# Patient Record
Sex: MALE | Race: WHITE | NOT HISPANIC OR LATINO | Employment: UNEMPLOYED | URBAN - METROPOLITAN AREA
[De-identification: names, ages, dates, MRNs, and addresses within clinical notes are randomized per-mention and may not be internally consistent; named-entity substitution may affect disease eponyms.]

---

## 2018-07-02 ENCOUNTER — HOSPITAL ENCOUNTER (INPATIENT)
Facility: HOSPITAL | Age: 53
LOS: 6 days | Discharge: HOME | End: 2018-07-08
Attending: EMERGENCY MEDICINE | Admitting: INTERNAL MEDICINE

## 2018-07-02 ENCOUNTER — APPOINTMENT (OUTPATIENT)
Dept: CT IMAGING | Facility: HOSPITAL | Age: 53
End: 2018-07-02

## 2018-07-02 DIAGNOSIS — R10.84 DIFFUSE ABDOMINAL PAIN: ICD-10-CM

## 2018-07-02 DIAGNOSIS — K70.11 ALCOHOLIC HEPATITIS WITH ASCITES: ICD-10-CM

## 2018-07-02 DIAGNOSIS — K70.31 ASCITES DUE TO ALCOHOLIC CIRRHOSIS (HCC): Primary | ICD-10-CM

## 2018-07-02 DIAGNOSIS — R79.89 ABNORMAL LFTS: ICD-10-CM

## 2018-07-02 DIAGNOSIS — R00.0 TACHYCARDIA: ICD-10-CM

## 2018-07-02 DIAGNOSIS — K70.11 ASCITES DUE TO ALCOHOLIC HEPATITIS: ICD-10-CM

## 2018-07-02 DIAGNOSIS — Z23 NEED FOR HEPATITIS A AND B VACCINATION: ICD-10-CM

## 2018-07-02 PROBLEM — K74.60 CIRRHOSIS (HCC): Status: ACTIVE | Noted: 2018-07-02

## 2018-07-02 PROBLEM — Z72.0 TOBACCO USE: Status: ACTIVE | Noted: 2018-07-02

## 2018-07-02 PROBLEM — Z78.9 ALCOHOL USE: Status: ACTIVE | Noted: 2018-07-02

## 2018-07-02 PROBLEM — I10 HYPERTENSION: Status: ACTIVE | Noted: 2018-07-02

## 2018-07-02 LAB
ALBUMIN SERPL-MCNC: 3.01 G/DL (ref 3.2–4.8)
ALBUMIN/GLOB SERPL: 0.6 G/DL (ref 1.5–2.5)
ALP SERPL-CCNC: 324 U/L (ref 25–100)
ALT SERPL W P-5'-P-CCNC: 30 U/L (ref 7–40)
AMMONIA BLD-SCNC: 31 UMOL/L (ref 19–60)
ANION GAP SERPL CALCULATED.3IONS-SCNC: 10 MMOL/L (ref 3–11)
APTT PPP: 26.4 SECONDS (ref 24–31)
AST SERPL-CCNC: 249 U/L (ref 0–33)
BACTERIA UR QL AUTO: NORMAL /HPF
BASOPHILS # BLD AUTO: 0.03 10*3/MM3 (ref 0–0.2)
BASOPHILS NFR BLD AUTO: 0.2 % (ref 0–1)
BILIRUB SERPL-MCNC: 4 MG/DL (ref 0.3–1.2)
BILIRUB UR QL STRIP: ABNORMAL
BUN BLD-MCNC: 7 MG/DL (ref 9–23)
BUN/CREAT SERPL: 10.3 (ref 7–25)
CALCIUM SPEC-SCNC: 8.7 MG/DL (ref 8.7–10.4)
CHLORIDE SERPL-SCNC: 89 MMOL/L (ref 99–109)
CLARITY UR: CLEAR
CO2 SERPL-SCNC: 38 MMOL/L (ref 20–31)
COLOR UR: ABNORMAL
CREAT BLD-MCNC: 0.68 MG/DL (ref 0.6–1.3)
DEPRECATED RDW RBC AUTO: 60.1 FL (ref 37–54)
EOSINOPHIL # BLD AUTO: 0.01 10*3/MM3 (ref 0–0.3)
EOSINOPHIL NFR BLD AUTO: 0.1 % (ref 0–3)
ERYTHROCYTE [DISTWIDTH] IN BLOOD BY AUTOMATED COUNT: 15.7 % (ref 11.3–14.5)
ETHANOL BLD-MCNC: 37 MG/DL (ref 0–10)
GFR SERPL CREATININE-BSD FRML MDRD: 123 ML/MIN/1.73
GLOBULIN UR ELPH-MCNC: 4.8 GM/DL
GLUCOSE BLD-MCNC: 120 MG/DL (ref 70–100)
GLUCOSE UR STRIP-MCNC: NEGATIVE MG/DL
HCT VFR BLD AUTO: 40.8 % (ref 38.9–50.9)
HGB BLD-MCNC: 14.2 G/DL (ref 13.1–17.5)
HGB UR QL STRIP.AUTO: NEGATIVE
HOLD SPECIMEN: NORMAL
HOLD SPECIMEN: NORMAL
HYALINE CASTS UR QL AUTO: NORMAL /LPF
IMM GRANULOCYTES # BLD: 0.05 10*3/MM3 (ref 0–0.03)
IMM GRANULOCYTES NFR BLD: 0.3 % (ref 0–0.6)
INR PPP: 1.31 (ref 0.91–1.09)
KETONES UR QL STRIP: NEGATIVE
LEUKOCYTE ESTERASE UR QL STRIP.AUTO: ABNORMAL
LIPASE SERPL-CCNC: 26 U/L (ref 6–51)
LYMPHOCYTES # BLD AUTO: 0.89 10*3/MM3 (ref 0.6–4.8)
LYMPHOCYTES NFR BLD AUTO: 6.2 % (ref 24–44)
MCH RBC QN AUTO: 36.7 PG (ref 27–31)
MCHC RBC AUTO-ENTMCNC: 34.8 G/DL (ref 32–36)
MCV RBC AUTO: 105.4 FL (ref 80–99)
MONOCYTES # BLD AUTO: 1.01 10*3/MM3 (ref 0–1)
MONOCYTES NFR BLD AUTO: 7 % (ref 0–12)
NEUTROPHILS # BLD AUTO: 12.45 10*3/MM3 (ref 1.5–8.3)
NEUTROPHILS NFR BLD AUTO: 86.5 % (ref 41–71)
NITRITE UR QL STRIP: POSITIVE
PH UR STRIP.AUTO: 6.5 [PH] (ref 5–8)
PLATELET # BLD AUTO: 160 10*3/MM3 (ref 150–450)
PMV BLD AUTO: 12.2 FL (ref 6–12)
POTASSIUM BLD-SCNC: 3 MMOL/L (ref 3.5–5.5)
PROT SERPL-MCNC: 7.8 G/DL (ref 5.7–8.2)
PROT UR QL STRIP: ABNORMAL
PROTHROMBIN TIME: 13.8 SECONDS (ref 9.6–11.5)
RBC # BLD AUTO: 3.87 10*6/MM3 (ref 4.2–5.76)
RBC # UR: NORMAL /HPF
REF LAB TEST METHOD: NORMAL
SODIUM BLD-SCNC: 137 MMOL/L (ref 132–146)
SP GR UR STRIP: 1.03 (ref 1–1.03)
SQUAMOUS #/AREA URNS HPF: NORMAL /HPF
UROBILINOGEN UR QL STRIP: ABNORMAL
WBC NRBC COR # BLD: 14.39 10*3/MM3 (ref 3.5–10.8)
WBC UR QL AUTO: NORMAL /HPF
WHOLE BLOOD HOLD SPECIMEN: NORMAL
WHOLE BLOOD HOLD SPECIMEN: NORMAL

## 2018-07-02 PROCEDURE — 80306 DRUG TEST PRSMV INSTRMNT: CPT | Performed by: NURSE PRACTITIONER

## 2018-07-02 PROCEDURE — 85730 THROMBOPLASTIN TIME PARTIAL: CPT | Performed by: EMERGENCY MEDICINE

## 2018-07-02 PROCEDURE — 99223 1ST HOSP IP/OBS HIGH 75: CPT | Performed by: INTERNAL MEDICINE

## 2018-07-02 PROCEDURE — 83690 ASSAY OF LIPASE: CPT

## 2018-07-02 PROCEDURE — 80307 DRUG TEST PRSMV CHEM ANLYZR: CPT | Performed by: EMERGENCY MEDICINE

## 2018-07-02 PROCEDURE — 25010000002 LORAZEPAM PER 2 MG: Performed by: INTERNAL MEDICINE

## 2018-07-02 PROCEDURE — 80053 COMPREHEN METABOLIC PANEL: CPT

## 2018-07-02 PROCEDURE — 25010000002 THIAMINE PER 100 MG: Performed by: NURSE PRACTITIONER

## 2018-07-02 PROCEDURE — 74178 CT ABD&PLV WO CNTR FLWD CNTR: CPT

## 2018-07-02 PROCEDURE — 85610 PROTHROMBIN TIME: CPT | Performed by: EMERGENCY MEDICINE

## 2018-07-02 PROCEDURE — 0 IOPAMIDOL PER 1 ML: Performed by: EMERGENCY MEDICINE

## 2018-07-02 PROCEDURE — 99406 BEHAV CHNG SMOKING 3-10 MIN: CPT | Performed by: NURSE PRACTITIONER

## 2018-07-02 PROCEDURE — 82140 ASSAY OF AMMONIA: CPT | Performed by: EMERGENCY MEDICINE

## 2018-07-02 PROCEDURE — 81001 URINALYSIS AUTO W/SCOPE: CPT | Performed by: EMERGENCY MEDICINE

## 2018-07-02 PROCEDURE — 99284 EMERGENCY DEPT VISIT MOD MDM: CPT

## 2018-07-02 PROCEDURE — 85025 COMPLETE CBC W/AUTO DIFF WBC: CPT

## 2018-07-02 RX ORDER — SODIUM CHLORIDE 0.9 % (FLUSH) 0.9 %
10 SYRINGE (ML) INJECTION AS NEEDED
Status: DISCONTINUED | OUTPATIENT
Start: 2018-07-02 | End: 2018-07-08 | Stop reason: HOSPADM

## 2018-07-02 RX ORDER — THIAMINE MONONITRATE (VIT B1) 100 MG
100 TABLET ORAL DAILY
Status: DISCONTINUED | OUTPATIENT
Start: 2018-07-03 | End: 2018-07-03

## 2018-07-02 RX ORDER — BUPRENORPHINE HYDROCHLORIDE AND NALOXONE HYDROCHLORIDE DIHYDRATE 8; 2 MG/1; MG/1
1 TABLET SUBLINGUAL DAILY
Status: DISCONTINUED | OUTPATIENT
Start: 2018-07-03 | End: 2018-07-05

## 2018-07-02 RX ORDER — SODIUM CHLORIDE 0.9 % (FLUSH) 0.9 %
1-10 SYRINGE (ML) INJECTION AS NEEDED
Status: DISCONTINUED | OUTPATIENT
Start: 2018-07-02 | End: 2018-07-08 | Stop reason: HOSPADM

## 2018-07-02 RX ORDER — AMLODIPINE BESYLATE 10 MG/1
10 TABLET ORAL DAILY
COMMUNITY
End: 2018-07-08 | Stop reason: HOSPADM

## 2018-07-02 RX ORDER — NICOTINE 21 MG/24HR
1 PATCH, TRANSDERMAL 24 HOURS TRANSDERMAL ONCE
Status: COMPLETED | OUTPATIENT
Start: 2018-07-02 | End: 2018-07-03

## 2018-07-02 RX ORDER — BUPRENORPHINE HYDROCHLORIDE AND NALOXONE HYDROCHLORIDE DIHYDRATE 8; 2 MG/1; MG/1
2 TABLET SUBLINGUAL DAILY
COMMUNITY

## 2018-07-02 RX ORDER — AMLODIPINE BESYLATE 10 MG/1
10 TABLET ORAL DAILY
Status: DISCONTINUED | OUTPATIENT
Start: 2018-07-03 | End: 2018-07-03

## 2018-07-02 RX ORDER — ONDANSETRON 4 MG/1
4 TABLET, FILM COATED ORAL EVERY 6 HOURS PRN
Status: DISCONTINUED | OUTPATIENT
Start: 2018-07-02 | End: 2018-07-08 | Stop reason: HOSPADM

## 2018-07-02 RX ORDER — NICOTINE 21 MG/24HR
1 PATCH, TRANSDERMAL 24 HOURS TRANSDERMAL EVERY 24 HOURS
Status: DISCONTINUED | OUTPATIENT
Start: 2018-07-03 | End: 2018-07-08 | Stop reason: HOSPADM

## 2018-07-02 RX ORDER — THIAMINE MONONITRATE (VIT B1) 100 MG
100 TABLET ORAL ONCE
Status: COMPLETED | OUTPATIENT
Start: 2018-07-02 | End: 2018-07-02

## 2018-07-02 RX ORDER — POTASSIUM CHLORIDE 7.45 MG/ML
10 INJECTION INTRAVENOUS
Status: DISCONTINUED | OUTPATIENT
Start: 2018-07-02 | End: 2018-07-08 | Stop reason: HOSPADM

## 2018-07-02 RX ORDER — DIAZEPAM 5 MG/1
5 TABLET ORAL EVERY 8 HOURS
Status: DISCONTINUED | OUTPATIENT
Start: 2018-07-02 | End: 2018-07-04

## 2018-07-02 RX ORDER — HYDROCHLOROTHIAZIDE 12.5 MG/1
12.5 TABLET ORAL DAILY
COMMUNITY
End: 2018-07-08 | Stop reason: HOSPADM

## 2018-07-02 RX ORDER — LORAZEPAM 1 MG/1
1 TABLET ORAL
Status: DISCONTINUED | OUTPATIENT
Start: 2018-07-02 | End: 2018-07-08 | Stop reason: HOSPADM

## 2018-07-02 RX ORDER — FUROSEMIDE 20 MG/1
20 TABLET ORAL 2 TIMES DAILY
COMMUNITY
End: 2018-07-08 | Stop reason: HOSPADM

## 2018-07-02 RX ORDER — LORAZEPAM 2 MG/ML
2 INJECTION INTRAMUSCULAR
Status: DISCONTINUED | OUTPATIENT
Start: 2018-07-02 | End: 2018-07-08 | Stop reason: HOSPADM

## 2018-07-02 RX ORDER — POTASSIUM CHLORIDE 750 MG/1
40 CAPSULE, EXTENDED RELEASE ORAL AS NEEDED
Status: DISCONTINUED | OUTPATIENT
Start: 2018-07-02 | End: 2018-07-08 | Stop reason: HOSPADM

## 2018-07-02 RX ORDER — OXYCODONE AND ACETAMINOPHEN 10; 325 MG/1; MG/1
1 TABLET ORAL EVERY 6 HOURS PRN
Status: ON HOLD | COMMUNITY
End: 2018-07-05

## 2018-07-02 RX ORDER — LORAZEPAM 2 MG/ML
1 INJECTION INTRAMUSCULAR
Status: DISCONTINUED | OUTPATIENT
Start: 2018-07-02 | End: 2018-07-08 | Stop reason: HOSPADM

## 2018-07-02 RX ORDER — ONDANSETRON 2 MG/ML
4 INJECTION INTRAMUSCULAR; INTRAVENOUS EVERY 6 HOURS PRN
Status: DISCONTINUED | OUTPATIENT
Start: 2018-07-02 | End: 2018-07-08 | Stop reason: HOSPADM

## 2018-07-02 RX ORDER — POTASSIUM CHLORIDE 1.5 G/1.77G
40 POWDER, FOR SOLUTION ORAL AS NEEDED
Status: DISCONTINUED | OUTPATIENT
Start: 2018-07-02 | End: 2018-07-08 | Stop reason: HOSPADM

## 2018-07-02 RX ORDER — DIPHENOXYLATE HYDROCHLORIDE AND ATROPINE SULFATE 2.5; .025 MG/1; MG/1
1 TABLET ORAL DAILY
Status: COMPLETED | OUTPATIENT
Start: 2018-07-03 | End: 2018-07-05

## 2018-07-02 RX ORDER — FOLIC ACID 1 MG/1
1 TABLET ORAL DAILY
Status: COMPLETED | OUTPATIENT
Start: 2018-07-03 | End: 2018-07-05

## 2018-07-02 RX ORDER — FUROSEMIDE 20 MG/1
20 TABLET ORAL
Status: DISCONTINUED | OUTPATIENT
Start: 2018-07-02 | End: 2018-07-03

## 2018-07-02 RX ORDER — LORAZEPAM 1 MG/1
2 TABLET ORAL
Status: DISCONTINUED | OUTPATIENT
Start: 2018-07-02 | End: 2018-07-08 | Stop reason: HOSPADM

## 2018-07-02 RX ORDER — BUPRENORPHINE HYDROCHLORIDE AND NALOXONE HYDROCHLORIDE DIHYDRATE 8; 2 MG/1; MG/1
1 TABLET SUBLINGUAL ONCE
Status: COMPLETED | OUTPATIENT
Start: 2018-07-02 | End: 2018-07-02

## 2018-07-02 RX ADMIN — DIAZEPAM 5 MG: 5 TABLET ORAL at 21:41

## 2018-07-02 RX ADMIN — THIAMINE HYDROCHLORIDE 100 MG: 100 INJECTION, SOLUTION INTRAMUSCULAR; INTRAVENOUS at 21:49

## 2018-07-02 RX ADMIN — IOPAMIDOL 100 ML: 755 INJECTION, SOLUTION INTRAVENOUS at 17:51

## 2018-07-02 RX ADMIN — LORAZEPAM 2 MG: 2 INJECTION INTRAMUSCULAR; INTRAVENOUS at 23:07

## 2018-07-02 RX ADMIN — NICOTINE 1 PATCH: 21 PATCH, EXTENDED RELEASE TRANSDERMAL at 21:41

## 2018-07-02 RX ADMIN — FUROSEMIDE 20 MG: 20 TABLET ORAL at 21:41

## 2018-07-02 RX ADMIN — BUPRENORPHINE AND NALOXONE 1 TABLET: 8; 2 TABLET SUBLINGUAL at 21:41

## 2018-07-02 RX ADMIN — POTASSIUM CHLORIDE 40 MEQ: 750 CAPSULE, EXTENDED RELEASE ORAL at 23:07

## 2018-07-02 RX ADMIN — SODIUM CHLORIDE 1000 ML: 9 INJECTION, SOLUTION INTRAVENOUS at 19:43

## 2018-07-03 ENCOUNTER — APPOINTMENT (OUTPATIENT)
Dept: CT IMAGING | Facility: HOSPITAL | Age: 53
End: 2018-07-03

## 2018-07-03 PROBLEM — K70.10 ALCOHOLIC HEPATITIS: Status: ACTIVE | Noted: 2018-07-03

## 2018-07-03 PROBLEM — D72.829 LEUKOCYTOSIS: Status: ACTIVE | Noted: 2018-07-03

## 2018-07-03 PROBLEM — F11.20 OPIOID DEPENDENCE (HCC): Status: ACTIVE | Noted: 2018-07-03

## 2018-07-03 LAB
ALBUMIN SERPL-MCNC: 2.39 G/DL (ref 3.2–4.8)
ALBUMIN/GLOB SERPL: 0.6 G/DL (ref 1.5–2.5)
ALP SERPL-CCNC: 246 U/L (ref 25–100)
ALT SERPL W P-5'-P-CCNC: 24 U/L (ref 7–40)
AMPHET+METHAMPHET UR QL: NEGATIVE
AMPHETAMINES UR QL: NEGATIVE
ANION GAP SERPL CALCULATED.3IONS-SCNC: 7 MMOL/L (ref 3–11)
APPEARANCE FLD: CLEAR
AST SERPL-CCNC: 172 U/L (ref 0–33)
BARBITURATES UR QL SCN: NEGATIVE
BENZODIAZ UR QL SCN: NEGATIVE
BILIRUB SERPL-MCNC: 3.6 MG/DL (ref 0.3–1.2)
BUN BLD-MCNC: 9 MG/DL (ref 9–23)
BUN/CREAT SERPL: 14.3 (ref 7–25)
BUPRENORPHINE SERPL-MCNC: POSITIVE NG/ML
CALCIUM SPEC-SCNC: 7.9 MG/DL (ref 8.7–10.4)
CANNABINOIDS SERPL QL: NEGATIVE
CHLORIDE SERPL-SCNC: 91 MMOL/L (ref 99–109)
CO2 SERPL-SCNC: 37 MMOL/L (ref 20–31)
COCAINE UR QL: NEGATIVE
COLOR FLD: YELLOW
CREAT BLD-MCNC: 0.63 MG/DL (ref 0.6–1.3)
DEPRECATED RDW RBC AUTO: 62.8 FL (ref 37–54)
ERYTHROCYTE [DISTWIDTH] IN BLOOD BY AUTOMATED COUNT: 16.1 % (ref 11.3–14.5)
GFR SERPL CREATININE-BSD FRML MDRD: 135 ML/MIN/1.73
GLOBULIN UR ELPH-MCNC: 3.8 GM/DL
GLUCOSE BLD-MCNC: 86 MG/DL (ref 70–100)
HAV IGM SERPL QL IA: NORMAL
HBV CORE IGM SERPL QL IA: NORMAL
HBV SURFACE AB SER RIA-ACNC: NORMAL
HBV SURFACE AG SERPL QL IA: NORMAL
HCT VFR BLD AUTO: 34.1 % (ref 38.9–50.9)
HCV AB SER DONR QL: NORMAL
HGB BLD-MCNC: 11.6 G/DL (ref 13.1–17.5)
LYMPHOCYTES NFR FLD MANUAL: 28 %
MACROPHAGE FLUID: 28 %
MAGNESIUM SERPL-MCNC: 1.2 MG/DL (ref 1.3–2.7)
MCH RBC QN AUTO: 36.3 PG (ref 27–31)
MCHC RBC AUTO-ENTMCNC: 34 G/DL (ref 32–36)
MCV RBC AUTO: 106.6 FL (ref 80–99)
MESOTHL CELL NFR FLD MANUAL: 24 %
METHADONE UR QL SCN: NEGATIVE
MONOCYTES NFR FLD: 12 %
NEUTROPHILS NFR FLD MANUAL: 8 %
OPIATES UR QL: NEGATIVE
OXYCODONE UR QL SCN: NEGATIVE
PCP UR QL SCN: NEGATIVE
PLATELET # BLD AUTO: 138 10*3/MM3 (ref 150–450)
PMV BLD AUTO: 12.5 FL (ref 6–12)
POTASSIUM BLD-SCNC: 2.7 MMOL/L (ref 3.5–5.5)
POTASSIUM BLD-SCNC: 3.7 MMOL/L (ref 3.5–5.5)
PROPOXYPH UR QL: NEGATIVE
PROT FLD-MCNC: 3 G/DL
PROT SERPL-MCNC: 6.2 G/DL (ref 5.7–8.2)
RBC # BLD AUTO: 3.2 10*6/MM3 (ref 4.2–5.76)
RBC # FLD AUTO: <1000 /MM3
SODIUM BLD-SCNC: 135 MMOL/L (ref 132–146)
TRICYCLICS UR QL SCN: NEGATIVE
WBC # FLD: 170 /MM3
WBC NRBC COR # BLD: 8.35 10*3/MM3 (ref 3.5–10.8)

## 2018-07-03 PROCEDURE — 87070 CULTURE OTHR SPECIMN AEROBIC: CPT | Performed by: HOSPITALIST

## 2018-07-03 PROCEDURE — 87205 SMEAR GRAM STAIN: CPT | Performed by: HOSPITALIST

## 2018-07-03 PROCEDURE — 25010000002 THIAMINE PER 100 MG: Performed by: HOSPITALIST

## 2018-07-03 PROCEDURE — 89051 BODY FLUID CELL COUNT: CPT | Performed by: HOSPITALIST

## 2018-07-03 PROCEDURE — 80074 ACUTE HEPATITIS PANEL: CPT | Performed by: PHYSICIAN ASSISTANT

## 2018-07-03 PROCEDURE — 83735 ASSAY OF MAGNESIUM: CPT | Performed by: HOSPITALIST

## 2018-07-03 PROCEDURE — 25010000002 ENOXAPARIN PER 10 MG: Performed by: HOSPITALIST

## 2018-07-03 PROCEDURE — 25010000002 LORAZEPAM PER 2 MG: Performed by: INTERNAL MEDICINE

## 2018-07-03 PROCEDURE — 84157 ASSAY OF PROTEIN OTHER: CPT | Performed by: HOSPITALIST

## 2018-07-03 PROCEDURE — 0W9G3ZX DRAINAGE OF PERITONEAL CAVITY, PERCUTANEOUS APPROACH, DIAGNOSTIC: ICD-10-PCS | Performed by: RADIOLOGY

## 2018-07-03 PROCEDURE — 87015 SPECIMEN INFECT AGNT CONCNTJ: CPT | Performed by: HOSPITALIST

## 2018-07-03 PROCEDURE — 25010000002 MAGNESIUM SULFATE 2 GM/50ML SOLUTION: Performed by: HOSPITALIST

## 2018-07-03 PROCEDURE — 86706 HEP B SURFACE ANTIBODY: CPT | Performed by: PHYSICIAN ASSISTANT

## 2018-07-03 PROCEDURE — 99233 SBSQ HOSP IP/OBS HIGH 50: CPT | Performed by: HOSPITALIST

## 2018-07-03 PROCEDURE — 88112 CYTOPATH CELL ENHANCE TECH: CPT | Performed by: NURSE PRACTITIONER

## 2018-07-03 PROCEDURE — 75989 ABSCESS DRAINAGE UNDER X-RAY: CPT

## 2018-07-03 PROCEDURE — 86708 HEPATITIS A ANTIBODY: CPT | Performed by: PHYSICIAN ASSISTANT

## 2018-07-03 PROCEDURE — 88305 TISSUE EXAM BY PATHOLOGIST: CPT | Performed by: NURSE PRACTITIONER

## 2018-07-03 PROCEDURE — 84132 ASSAY OF SERUM POTASSIUM: CPT | Performed by: HOSPITALIST

## 2018-07-03 PROCEDURE — 85027 COMPLETE CBC AUTOMATED: CPT | Performed by: NURSE PRACTITIONER

## 2018-07-03 PROCEDURE — 80053 COMPREHEN METABOLIC PANEL: CPT | Performed by: INTERNAL MEDICINE

## 2018-07-03 PROCEDURE — 82042 OTHER SOURCE ALBUMIN QUAN EA: CPT | Performed by: HOSPITALIST

## 2018-07-03 RX ORDER — POTASSIUM CHLORIDE 1.5 G/1.77G
40 POWDER, FOR SOLUTION ORAL AS NEEDED
Status: DISCONTINUED | OUTPATIENT
Start: 2018-07-03 | End: 2018-07-08 | Stop reason: HOSPADM

## 2018-07-03 RX ORDER — LIDOCAINE HYDROCHLORIDE 10 MG/ML
10 INJECTION, SOLUTION INFILTRATION; PERINEURAL ONCE
Status: COMPLETED | OUTPATIENT
Start: 2018-07-03 | End: 2018-07-03

## 2018-07-03 RX ORDER — POTASSIUM CHLORIDE 750 MG/1
40 CAPSULE, EXTENDED RELEASE ORAL AS NEEDED
Status: DISCONTINUED | OUTPATIENT
Start: 2018-07-03 | End: 2018-07-08 | Stop reason: HOSPADM

## 2018-07-03 RX ORDER — MAGNESIUM SULFATE HEPTAHYDRATE 40 MG/ML
4 INJECTION, SOLUTION INTRAVENOUS AS NEEDED
Status: DISCONTINUED | OUTPATIENT
Start: 2018-07-03 | End: 2018-07-08 | Stop reason: HOSPADM

## 2018-07-03 RX ORDER — MAGNESIUM SULFATE HEPTAHYDRATE 40 MG/ML
2 INJECTION, SOLUTION INTRAVENOUS AS NEEDED
Status: DISCONTINUED | OUTPATIENT
Start: 2018-07-03 | End: 2018-07-08 | Stop reason: HOSPADM

## 2018-07-03 RX ADMIN — LORAZEPAM 2 MG: 2 INJECTION INTRAMUSCULAR; INTRAVENOUS at 20:06

## 2018-07-03 RX ADMIN — THIAMINE HYDROCHLORIDE 500 MG: 100 INJECTION, SOLUTION INTRAMUSCULAR; INTRAVENOUS at 09:12

## 2018-07-03 RX ADMIN — FOLIC ACID 1 MG: 1 TABLET ORAL at 09:13

## 2018-07-03 RX ADMIN — THIAMINE HYDROCHLORIDE 500 MG: 100 INJECTION, SOLUTION INTRAMUSCULAR; INTRAVENOUS at 17:41

## 2018-07-03 RX ADMIN — ENOXAPARIN SODIUM 40 MG: 40 INJECTION SUBCUTANEOUS at 11:46

## 2018-07-03 RX ADMIN — AMLODIPINE BESYLATE 10 MG: 10 TABLET ORAL at 09:13

## 2018-07-03 RX ADMIN — BUPRENORPHINE AND NALOXONE 1 TABLET: 8; 2 TABLET SUBLINGUAL at 09:13

## 2018-07-03 RX ADMIN — LIDOCAINE HYDROCHLORIDE 10 ML: 10 INJECTION, SOLUTION INFILTRATION; PERINEURAL at 14:50

## 2018-07-03 RX ADMIN — FUROSEMIDE 20 MG: 20 TABLET ORAL at 09:13

## 2018-07-03 RX ADMIN — MAGNESIUM SULFATE HEPTAHYDRATE 2 G: 40 INJECTION, SOLUTION INTRAVENOUS at 18:15

## 2018-07-03 RX ADMIN — NICOTINE 1 PATCH: 21 PATCH, EXTENDED RELEASE TRANSDERMAL at 09:13

## 2018-07-03 RX ADMIN — LORAZEPAM 1 MG: 2 INJECTION INTRAMUSCULAR; INTRAVENOUS at 13:40

## 2018-07-03 RX ADMIN — MAGNESIUM SULFATE IN WATER 4 G: 40 INJECTION, SOLUTION INTRAVENOUS at 11:46

## 2018-07-03 RX ADMIN — DIAZEPAM 5 MG: 5 TABLET ORAL at 22:09

## 2018-07-03 RX ADMIN — DIAZEPAM 5 MG: 5 TABLET ORAL at 05:45

## 2018-07-03 RX ADMIN — Medication 1 TABLET: at 09:13

## 2018-07-03 RX ADMIN — POTASSIUM CHLORIDE 40 MEQ: 750 CAPSULE, EXTENDED RELEASE ORAL at 09:12

## 2018-07-03 RX ADMIN — LORAZEPAM 2 MG: 2 INJECTION INTRAMUSCULAR; INTRAVENOUS at 15:44

## 2018-07-03 RX ADMIN — LORAZEPAM 2 MG: 2 INJECTION INTRAMUSCULAR; INTRAVENOUS at 13:43

## 2018-07-03 RX ADMIN — POTASSIUM CHLORIDE 40 MEQ: 750 CAPSULE, EXTENDED RELEASE ORAL at 05:45

## 2018-07-03 RX ADMIN — LORAZEPAM 2 MG: 1 TABLET ORAL at 11:46

## 2018-07-03 NOTE — CONSULTS
General Surgery Consultation Note    Date of Service: 7/3/2018    Sd Miller  [id number]  1965      Referring Provider: Ina Conway MD    Location of Consult: [facility]      Reason for Consultation: Inguinal Hernia       History of Present Illness:  I am seeing, Sd Miller, in consultation for Ina Conway MD regarding an inguinal hernia. Mr. Miller is a 52-year-old male with a history of alcohol abuse who was referred by his primary care physician to the emergency department for evaluation of abdominal pain, distention, and fluid retention. Approximately 3 months ago, the patient began having episodes of nausea and emesis. He was seen by his primary care physician several weeks ago and diagnosed with a viral infection.  At that time, the patient noticed a swelling in his testicles that progressively worsened. He was evaluated at a local emergency department for this testicular swelling, after which he was referred to a urologist.  The urologist felt the testicular swelling was due to a hernia; thus, the patient was referred to a general surgeon for additioanl evaluation.  Per the patient, the general surgeon was concerned about the patient's abdominal exam, specifically that there was ascites present. A CT scan of the abdomen and pelvis was performed that demonstrated fatty infiltration of the liver and ascites, concerning for liver failure. The patient was admitted to [facility] yesterday for these findings.  General surgery has been consulted for the testicular swelling.    The patient denies any history of testicular swelling prior to several months ago, and since that time, his testicles have progressively enlarged.  The right testicle is more swollen than the right and also more painful.  There are no fevers, chills, abdominal pain, or inability to pass flatus or have bowel movements. He endorses nausea without emesis, which has worsened along with the abdominal distention.  There is no known history  of hernia.    Past Medical History:   Diagnosis Date   • Hypertension      Past Surgical History  None    Allergies   Allergen Reactions   • Lipitor [Atorvastatin] Unknown (See Comments)     KIDNEY SHUTS DOWN        No current facility-administered medications on file prior to encounter.      No current outpatient prescriptions on file prior to encounter.         Current Facility-Administered Medications:   •  buprenorphine-naloxone (SUBOXONE) 8-2 MG per SL tablet 1 tablet, 1 tablet, Sublingual, Daily, Vonda Best MD, 1 tablet at 07/03/18 0913  •  diazePAM (VALIUM) tablet 5 mg, 5 mg, Oral, Q8H, Vonda Best MD, 5 mg at 07/03/18 0545  •  enoxaparin (LOVENOX) syringe 40 mg, 40 mg, Subcutaneous, Q24H, Ina Conway MD, 40 mg at 07/03/18 1146  •  [DISCONTINUED] thiamine (VITAMIN B-1) tablet 100 mg, 100 mg, Oral, Daily **AND** multivitamin (THERAGRAN) tablet 1 tablet, 1 tablet, Oral, Daily, 1 tablet at 07/03/18 0913 **AND** folic acid (FOLVITE) tablet 1 mg, 1 mg, Oral, Daily, Lea Siu, APRN, 1 mg at 07/03/18 0913  •  LORazepam (ATIVAN) tablet 1 mg, 1 mg, Oral, Q2H PRN **OR** LORazepam (ATIVAN) injection 1 mg, 1 mg, Intravenous, Q2H PRN **OR** LORazepam (ATIVAN) tablet 2 mg, 2 mg, Oral, Q1H PRN, 2 mg at 07/03/18 1146 **OR** LORazepam (ATIVAN) injection 2 mg, 2 mg, Intravenous, Q1H PRN, 2 mg at 07/02/18 2307 **OR** LORazepam (ATIVAN) injection 2 mg, 2 mg, Intravenous, Q15 Min PRN, 2 mg at 07/03/18 1343 **OR** LORazepam (ATIVAN) injection 2 mg, 2 mg, Intramuscular, Q15 Min PRN, Vonda Best MD  •  Magnesium Sulfate 2 gram Bolus, followed by 8 gram infusion (total Mg dose 10 grams)- Mg less than or equal to 1mg/dL, 2 g, Intravenous, PRN **OR** Magnesium Sulfate 2 gram / 50mL Infusion (GIVE X 3 BAGS TO EQUAL 6GM TOTAL DOSE) - Mg 1.1 - 1/5 mg/dl, 2 g, Intravenous, PRN **OR** Magnesium Sulfate 4 gram infusion- Mg 1.6-1.9 mg/dL, 4 g, Intravenous, PRN, Ina Conway MD, Last Rate: 25 mL/hr at  07/03/18 1146, 4 g at 07/03/18 1146  •  melatonin sublingual tablet 5 mg, 5 mg, Sublingual, Nightly PRN, TJ Paris  •  nicotine (NICODERM CQ) 21 MG/24HR patch 1 patch, 1 patch, Transdermal, Q24H, TJ Paris, 1 patch at 07/03/18 0913  •  ondansetron (ZOFRAN) tablet 4 mg, 4 mg, Oral, Q6H PRN **OR** ondansetron (ZOFRAN) injection 4 mg, 4 mg, Intravenous, Q6H PRN, TJ Paris  •  potassium chloride (MICRO-K) CR capsule 40 mEq, 40 mEq, Oral, PRN, 40 mEq at 07/03/18 0545 **OR** potassium chloride (KLOR-CON) packet 40 mEq, 40 mEq, Oral, PRN **OR** potassium chloride 10 mEq in 100 mL IVPB, 10 mEq, Intravenous, Q1H PRN, Vonda Best MD  •  potassium chloride (KLOR-CON) packet 40 mEq, 40 mEq, Oral, PRN, Ina Conway MD  •  potassium chloride (MICRO-K) CR capsule 40 mEq, 40 mEq, Oral, PRN, Ina Conway MD, 40 mEq at 07/03/18 0912  •  sodium chloride 0.9 % flush 1-10 mL, 1-10 mL, Intravenous, PRN, TJ Paris  •  sodium chloride 0.9 % flush 10 mL, 10 mL, Intravenous, PRN, Reinier Raymond MD  •  thiamine (B-1) 500 mg in sodium chloride 0.9 % 100 mL IVPB, 500 mg, Intravenous, Q8H, Ina Conway MD, Last Rate: 200 mL/hr at 07/03/18 0912, 500 mg at 07/03/18 0912        Family History   Problem Relation Age of Onset   • Diabetes Mother    • Hyperlipidemia Mother    • Hypertension Mother    • Diabetes Father    • No Known Problems Brother    • No Known Problems Daughter    • No Known Problems Brother    • No Known Problems Daughter    • No Known Problems Daughter        Social History     Social History   • Marital status:      Spouse name: N/A   • Number of children: N/A   • Years of education: N/A     Occupational History   • Not on file.     Social History Main Topics   • Smoking status: Current Every Day Smoker     Packs/day: 1.00     Years: 30.00     Types: Cigarettes   • Smokeless tobacco: Never Used   • Alcohol use Yes      Comment: 7 12 oz cans of mixed  "cocktails or beers daily    • Drug use: Yes      Comment: in recovery through a suboxone clinic    • Sexual activity: Defer     Other Topics Concern   • Not on file     Social History Narrative   • No narrative on file       Review of Systems:  Review of Systems   Constitutional: Positive for activity change, appetite change and unexpected weight change. Negative for chills and fever.   HENT: Negative.    Eyes: Negative.    Respiratory: Positive for shortness of breath.    Cardiovascular: Positive for leg swelling.   Gastrointestinal: Positive for abdominal distention and nausea. Negative for abdominal pain, diarrhea and vomiting.   Endocrine: Negative.    Genitourinary: Positive for scrotal swelling and testicular pain.   Musculoskeletal: Negative.    Allergic/Immunologic: Negative.    Neurological: Negative.    Hematological: Negative.    Psychiatric/Behavioral: Negative.        /88   Pulse 97   Temp 99 °F (37.2 °C) (Oral)   Resp 20   Ht 172.7 cm (68\")   Wt 71.5 kg (157 lb 9.6 oz)   SpO2 93%   BMI 23.96 kg/m²   Body mass index is 23.96 kg/m².    General: NAD, AAO x 3   HEENT: PER, no icterus, normal sclera.  Mucus membranes moist.  Neck supple without adenopathy.  Cardiac: Regular rate and rhythm without murmurs, rubs, or gallops.  Pulmonary: Clear to auscultation bilaterally without rales, ronchi, or wheezes.  Abdominal: Soft, distended, NT.  Neurologic: CN II - XII grossly intact.  No focal neuro deficits.  Extremities: Warm and well perfused.  No edema.  :  Normal external genitalia.  There is swelling in the right testicle and inguinal region that is tender on initial examination; this tenderness is not reproducible.  There is no hernia palpated.  Skin: No obvious rashes or worrisome lesions noted.    CBC    Results from last 7 days  Lab Units 07/03/18  0417   WBC 10*3/mm3 8.35   HEMOGLOBIN g/dL 11.6*   HEMATOCRIT % 34.1*   PLATELETS 10*3/mm3 138*       CMP    Results from last 7 days  Lab Units " 07/03/18  1043 07/03/18  0417   SODIUM mmol/L  --  135   POTASSIUM mmol/L 3.7 2.7*   CHLORIDE mmol/L  --  91*   CO2 mmol/L  --  37.0*   BUN mg/dL  --  9   CREATININE mg/dL  --  0.63   CALCIUM mg/dL  --  7.9*   BILIRUBIN mg/dL  --  3.6*   ALK PHOS U/L  --  246*   ALT (SGPT) U/L  --  24   AST (SGOT) U/L  --  172*   GLUCOSE mg/dL  --  86       Radiology  Imaging Results (last 72 hours)     Procedure Component Value Units Date/Time    CT Abdomen Pelvis With & Without Contrast [[id number]] Collected:  07/03/18 0914     Updated:  07/03/18 0917    Narrative:       EXAMINATION: CT ABDOMEN AND PELVIS W WO CONTRAST-      INDICATION: Liver protocol - cirrhosis, ascites, diffuse abdominal pain,  vomiting.      TECHNIQUE: CT scan of the abdomen and pelvis was performed prior to and  following intravenous contrast.      The radiation dose reduction device was turned on for each scan per the  ALARA (As Low as Reasonably Achievable) protocol.     COMPARISON: None.     FINDINGS: The most superior images demonstrate no basilar pulmonary  inflammatory process or pleural effusion.      The liver demonstrates a severe pattern of diffuse fatty infiltration.  There is a trace of ascitic fluid. There is no adrenal mass. The  pancreas is normal. There is no renal mass, stone or obstruction.  There  is ascites. There is no aneurysm or retroperitoneal lymphadenopathy.       Impression:       There is severe diffuse fatty infiltration of the liver and  the spleen is at the upper limits of normal. There is ascites.     D:  07/02/2018  E:  07/03/2018        This report was finalized on 7/3/2018 9:15 AM by Dr. John Benedict MD.             Assessment:  Mr. Miller is a 52-year-old male, admitted with liver failure presumed secondary to alcoholic hepatitis, who is seen in consultation for testicular swelling concerning for inguinal hernia. On examination, there is swelling of the right inguinal region and right testicle more so than the left.  However, there are no intestinal contents appreciated on examination. I reviewed the patient's CT scan with Dr. Benedict, and on imaging, there is no abdominal viscera present within either inguinal canal but rather ascites, which is present in greater amount on the right side.  This accounts for the patient's physical examination.  I explained the CT scan findings to the patient.     As the patient's liver failure resolves (along with the ascites), the testicular swelling will improve. recommend control of the patient's ascites (with paracentesis, diuretics, or both).   I also suggest elevation of the testicles to decrease the swelling.  As there is no hernia present, I will sign off at this time. Please call if additional questions or concerns.    Plan:  - Testicular elevation  - Will follow      Roberta Mari MD  07/03/18  2:49 PM

## 2018-07-03 NOTE — PAYOR COMM NOTE
"Houston Guido (52 y.o. Male)     Date of Birth Social Security Number Address Home Phone MRN    1965  [address] [facility] [address] [phone] [id number]    [employer] Marital Status          None        Admission Date Admission Type Admitting Provider Attending Provider Department, Room/Bed    18 Emergency Ina Conway MD Krill, Kaleigh, MD [facility] 5G, S566/1    Discharge Date Discharge Disposition Discharge Destination                       Attending Provider:  Ina Conway MD    Allergies:  Lipitor [Atorvastatin]    Isolation:  None   Infection:  None   Code Status:  CPR    Ht:  172.7 cm (68\")   Wt:  71.5 kg (157 lb 9.6 oz)    Admission Cmt:  None   Principal Problem:  Alcoholic hepatitis [K70.10]                 Active Insurance as of 2018     Primary Coverage     Payor Plan Insurance Group Employer/Plan Group    PASSPORT PASSPORT MEDICAID     Payor Plan Address Payor Plan Phone Number Effective From Effective To    [address] [phone] 2018     [facility] [id number]       Subscriber Name Subscriber Birth Date Member ID       HOUSTON GUIDO 1965 [id number]                 Emergency Contacts      (Rel.) Home Phone Work Phone Mobile Phone    Rachel Guido (Spouse) [phone] -- --               History & Physical      Vonda Best MD at 2018  8:13 PM              [facility]  HISTORY AND PHYSICAL    Patient Name: Houston Guido  : 1965  MRN: [id number]  Primary Care Physician: No Known Provider    Subjective   Subjective     Chief Complaint: Abdominal Pain    HPI:  Houston Guido is a 52 y.o. male with past medical history of hypertension, opioid use, tobacco use, and alcohol use presents [facility] emergency department after being referred by his PCP for worsening evaluation of abdominal pain, distention, and fluid retention.  Patient reports that 3 " months ago he started having severe episodes of nausea and vomiting, which she attributed to his alcohol use.  Patient had been sober for 10 years but relapsed 2 years ago.  Patient reports that his last drink was 2 days ago.  Patient was seen by his PCP and ultimately diagnosed with a viral infection at that time.  Patient and noticed increased swelling in his testicles and went to the emergency department in [place], [place] where he was treated with oral antibiotics and referred to a urologist.  The urologist felt that the swelling of his testicles were consultations from his hernia, and CT abdomen pelvis was performed.  Patient was scheduled for a full body CT later this week.  Despite taking his buttocks as prescribed, patient reports the swelling in his testicles has gotten worse and now has spread to his legs and abdomen.  Patient was started on Lasix by his PCP, which has decreased the swelling in his ankles, however the swelling in his abdomen has remained unchanged.  CT of the abdomen and pelvis in the emergency department shows diffuse fatty infiltration of the liver and ascites. He was also found to have elevated Alkaline Phosphatase, AST, and bilirubin. Patient will be admitted to the hospital medicine service for further evaluation and treatment.     Review of Systems   Constitutional: Positive for unexpected weight change. Negative for chills, diaphoresis, fatigue and fever.   Respiratory: Negative for cough, shortness of breath and wheezing.    Cardiovascular: Positive for leg swelling. Negative for chest pain and palpitations.        Improved with lasix   Gastrointestinal: Positive for abdominal distention, abdominal pain, constipation, nausea and vomiting.   Genitourinary: Positive for difficulty urinating and scrotal swelling. Negative for frequency, hematuria and urgency.   Musculoskeletal: Negative for arthralgias and myalgias.   Skin: Negative for color change, pallor, rash and wound.    Neurological: Negative for dizziness, syncope, weakness and light-headedness.   Psychiatric/Behavioral: Negative for agitation and confusion.   All other systems reviewed and are negative.    Otherwise 10-system ROS reviewed and is negative except as mentioned in the HPI.    Personal History     Past Medical History:   Diagnosis Date   • Hypertension        History reviewed. No pertinent surgical history.    Family History: family history includes Diabetes in his father and mother; Hyperlipidemia in his mother; Hypertension in his mother; No Known Problems in his brother, brother, daughter, daughter, and daughter.     Social History:  reports that he has been smoking Cigarettes.  He has a 30.00 pack-year smoking history. He has never used smokeless tobacco. He reports that he drinks alcohol. He reports that he uses drugs.  Social History     Social History Narrative   • No narrative on file       Medications:  Prescriptions Prior to Admission   Medication Sig Dispense Refill Last Dose   • amLODIPine (NORVASC) 10 MG tablet Take 10 mg by mouth Daily.      • buprenorphine-naloxone (SUBOXONE) 8-2 MG per SL tablet Place 1 tablet under the tongue Daily.      • furosemide (LASIX) 20 MG tablet Take 20 mg by mouth 2 (Two) Times a Day.      • hydrochlorothiazide (HYDRODIURIL) 12.5 MG tablet Take 12.5 mg by mouth Daily.      • oxyCODONE-acetaminophen (PERCOCET)  MG per tablet Take 1 tablet by mouth Every 6 (Six) Hours As Needed for Moderate Pain .          Allergies   Allergen Reactions   • Lipitor [Atorvastatin] Unknown (See Comments)     KIDNEY SHUTS DOWN        Objective   Objective     Vital Signs:   Temp:  [98.1 °F (36.7 °C)] 98.1 °F (36.7 °C)  Heart Rate:  [108-135] 108  Resp:  [22] 22  BP: (120-153)/() 127/88        Physical Exam   Constitutional: He is oriented to person, place, and time. He appears well-developed and well-nourished.   HENT:   Head: Normocephalic and atraumatic.   Eyes: EOM are normal.  Pupils are equal, round, and reactive to light. Scleral icterus is present.   Neck: Normal range of motion. Neck supple. No JVD present.   Cardiovascular: Normal rate, regular rhythm, normal heart sounds and intact distal pulses.  Exam reveals no gallop and no friction rub.    No murmur heard.  Pulmonary/Chest: Effort normal. No respiratory distress. He has wheezes. He has no rales. He exhibits no tenderness.   Abdominal: Bowel sounds are normal. He exhibits distension and ascites. He exhibits no mass. There is generalized tenderness. There is no rebound and no guarding.   Genitourinary: Right testis shows swelling. Left testis shows swelling.   Musculoskeletal: Normal range of motion. He exhibits edema. He exhibits no tenderness or deformity.   +1 BLE   Neurological: He is alert and oriented to person, place, and time.   Skin: Skin is warm and dry. Capillary refill takes less than 2 seconds. No rash noted. No erythema. No pallor.   Psychiatric: He has a normal mood and affect. His behavior is normal. Judgment and thought content normal.   Nursing note and vitals reviewed.    Results Reviewed:  I have personally reviewed current lab, radiology, and data and agree.      Results from last 7 days  Lab Units 07/02/18  1718 07/02/18  1230   WBC 10*3/mm3  --  14.39*   HEMOGLOBIN g/dL  --  14.2   HEMATOCRIT %  --  40.8   PLATELETS 10*3/mm3  --  160   INR  1.31*  --        Results from last 7 days  Lab Units 07/02/18  1230   SODIUM mmol/L 137   POTASSIUM mmol/L 3.0*   CHLORIDE mmol/L 89*   CO2 mmol/L 38.0*   BUN mg/dL 7*   CREATININE mg/dL 0.68   GLUCOSE mg/dL 120*   CALCIUM mg/dL 8.7   ALT (SGPT) U/L 30   AST (SGOT) U/L 249*     Estimated Creatinine Clearance: 132.5 mL/min (by C-G formula based on SCr of 0.68 mg/dL).  Brief Urine Lab Results  (Last result in the past 365 days)      Color   Clarity   Blood   Leuk Est   Nitrite   Protein   CREAT   Urine HCG        07/02/18 1718 Orange(A) Clear Negative Small (1+)(A)  Positive(A) 30 mg/dL (1+)(A)             No results found for: BNP  Imaging Results (last 24 hours)     Procedure Component Value Units Date/Time    CT Abdomen Pelvis With & Without Contrast [[id number]] Updated:  18        Results for orders placed during the hospital encounter of 09/08/15   Echo - Converted    Narrative   [facility]     [address] [id number]     [phone]         ECHO     [id number]         Signed        PATIENT NAME:  HOUSTON GUIDO MRN:  [id number]    :  1965 ACCT#:  [id number]    ATTENDING:  BHARTI LOPEZ MD ADMIT DATE:  09/08/15    PRIMARY CARE:  CHASITY RODRIGUEZ MD LOCATION:          CC:  MITCH HASKINS MD, MD; BHARTI LOPEZ MD  ; CHASITY RODRIGUEZ MD         DATE:       2015          ECHOCARDIOGRAM          INDICATIONS:  MI.           CARDIAC DIMENSIONS:      Left atrium is 4.5 cm     Aortic root of 3 cm.     Septal wall thickness is 0.9.     Posterior wall thickness 1.1.      Left ventricular end diastolic 5.6.      Left ventricular end systolic is 3.9.     Fractional shortening is 35%.     Ejection fraction of 65%.          COMMENTS:  Left ventricular wall thickness appears to be normal.  Left     ventricular dimensions are normal. Left ventricular function is normal. No     obvious wall motion abnormality seen.          Right ventricle appears to be of normal size.  RV function is normal.          ATRIA:  The left atrium is qbffok-kw-wqswkutwxy enlarged. Right atrium is of     normal size.  Interatrial septum is intact.  No flow across the same.           MITRAL VALVE:  Thickened mitral valve leaflets.  Trace mitral insufficiency.      Mitral  in-flow pattern is appropriate. The E:E prime ratio is 7 with a     calculated AP of 11.          AORTIC VALVE: Trileaflet, structurally normal. No Doppler flow is appreciated.           TRICUSPID VALVE:  Structurally normal.  Mild tricuspid insufficiency.      Pulmonary pressures are  about 45 mmHg.          PULMONIC VALVE: Not well visualized.            AORTIC ROOT:  Is within normal limits. There is no pericardial effusion. There     is no pericardial thickening.  There is no intracardiac mass, thrombus, or     vegetation.  The IVC appears to be dilated at 2.5 cm.      CONCLUSIONS:      1.  Technically adequate study.     2.  Normal LV systolic function. (EF 65%).     3.  Mild-to-moderate left atrial enlargement with normal left ventricular end     diastolic pressures.     4.  Mild mitral insufficiency with thickened mitral valve leaflets.      5.  Mild tricuspid insufficiency with PA systolic of 45 mmHg.                          Mitch Haskins M.D.     AK/hollis     Job ID:   [id number]     Document ID: [id number]     Revised:  0                                      DICTATED BY:      MITCH HASKINS MD    DICTATED DATE/TIME:      09/08/15 1022    TRANSCRIBED DATE/TIME:      09/08/15 1247        SIGNED:         MITCH HASKINS MD          SIGNED DATE/TIME:      09/10/15 1347        COSIGNED:             COSIGNED DATE   TIME:               Assessment/Plan   Assessment / Plan     Hospital Problem List     * (Principal)Cirrhosis (CMS/HCC)    Leukocytosis    Opioid dependence (CMS/HCC)    Alcohol use    Hypertension    Tobacco use        Assessment & Plan:  - Admit to telemetry  - VS q4h  - Consult to GI   - New Cirrhosis   - NPO after midnight  - Paracentesis in AM  - CIWA protocol  - Scrotal sling  - Continue home medications as appropriate   - Consult to Case Management   - Patient would like information on rehab for his alcohol use   - Tobacco Cessation Education  - Nicotine replacement    Tobacco Cessation Counselin minutes of tobacco cessation counseling provided, including but not limited to, risks of ongoing tobacco use, pertinence to current or future health status and availability/examples of cessation resources.  Patient does not express a desire to quit.    DVT prophylaxis: TEDs/SCDs/HOLD  PHARMACOLOGIC    CODE STATUS:    Code Status and Medical Interventions:   Ordered at: 07/02/18 2110     Level Of Support Discussed With:    Patient     Code Status:    CPR     Medical Interventions (Level of Support Prior to Arrest):    Full       Admission Status:  I believe this patient meets INPATIENT status due to the need for care which can only be reasonably provided in an hospital setting such as aggressive/expedited ancillary services and/or consultation services, the necessity for IV medications, close physician monitoring and/or the possible need for procedures.  In such, I feel patient’s risk for adverse outcomes and need for care warrant INPATIENT evaluation and predict the patient’s care encounter to likely last beyond 2 midnights.    Electronically signed by TJ Zimmer, 07/02/18, 8:13 PM.      Brief Attending Admission Attestation     I have seen and examined the patient, performing an independent face-to-face diagnostic evaluation with plan of care reviewed and developed with Ms. Siu.       Brief Summary Statement/HPI:   Sd Miller is a 52 y.o. male with PMH of EtOH abuse, opioid dependence currently on Suboxone maintenance, and HTN who was sent here by his PCP for evaluation for cirrhosis. Pt reports that he has had worsening edema for the last few months- initially started in his scrotum and was sent to Urology who informed him he had a hernia. Pt then noted LE edema that worsened over the subsequent few months. He was put on Lasix and his LE and scrotal edema both improved.  Since that time, however, he has had increasing abdominal distention which seems resistant to diuretic therapy.  He denies any F/C.       Attending Physical Exam:  Constitutional: No acute distress, awake, alert  Eyes: PERRLA, sclerae anicteric, no conjunctival injection  HENT: NCAT, mucous membranes moist  Neck: Supple, no thyromegaly, no lymphadenopathy, trachea midline  Respiratory: Clear to  auscultation bilaterally, nonlabored respirations   Cardiovascular: RRR, no murmurs, rubs, or gallops, palpable pedal pulses bilaterally  Gastrointestinal: Positive bowel sounds, soft, nontender, + ascites  Musculoskeletal: 1+ pitting edema BLEs to mid shins,, no clubbing or cyanosis to extremities  Psychiatric: Appropriate affect, cooperative  Neurologic: Oriented x 3, strength symmetric in all extremities, Cranial Nerves grossly intact to confrontation, speech clear  Skin: No rashes      Brief Assessment/Plan :  Mr. Miller is a 51 yo WM w/ PMH of HTN, EtOH abuse and opioid dependence who presents with ascites and concern for EtOH cirrhosis.    Plan:  --NPO after MN for LVP in am. Will send for gram stain/culture to r/o SBP as pt also has leukocytosis with no other clear source  --continue suboxone, awaiting [facility] report but pt reports compliance with his Suboxone treatment clinic (even reports that he was prescribed Percocet in mid June and that he and his Pharmacy called his Suboxone clinic so that his medication could be adjusted/stopped for a few days).   --defer abx for time being as not febrile and no abd tenderness so doubt SBP at this time.    See above for further detailed assessment and plan developed with APC which I have reviewed and/or edited.      Electronically signed by Vonda Best MD, 07/03/18, 12:26 AM.           Electronically signed by Vonda Best MD at 7/3/2018 12:33 AM             ICU Vital Signs     Row Name 07/03/18 1124 07/03/18 1030 07/03/18 0855 07/03/18 0749 07/03/18 0550       Vitals    Temp 99 °F (37.2 °C)  --  -- 98.2 °F (36.8 °C) 99 °F (37.2 °C)    Temp src Oral  --  -- Oral Oral    Pulse  --  --  -- 92 101    Heart Rate Source Monitor  --  -- Monitor Monitor    Resp 18  --  --  -- 18    Resp Rate Source Visual  --  --  -- Visual    /76  --  -- 97/69 112/82    BP Location  --  --  --  -- Left arm    BP Method  --  --  --  -- Automatic    Patient Position   "--  --  --  -- Lying       Oxygen Therapy    Device (Oxygen Therapy)  -- room air room air  --  --    Row Name 07/02/18 2359 07/02/18 2100 07/02/18 2014 07/02/18 2013 07/02/18 2010       Height and Weight    Height  -- 172.7 cm (68\")  --  --  --    Height Method  -- Stated  --  --  --    Weight  -- 71.5 kg (157 lb 9.6 oz)  --  --  --    Weight Method  -- Standing scale  --  --  --    Ideal Body Weight (IBW) (kg)  -- 70.89  --  --  --    BSA (Calculated - sq m)  -- 1.85 sq meters  --  --  --    BMI (Calculated)  -- 24  --  --  --    Weight in (lb) to have BMI = 25  -- 164.1  --  --  --       Vitals    Temp 99.4 °F (37.4 °C)  --  -- 99.6 °F (37.6 °C)  --    Temp src Oral  --  -- Oral  --    Pulse 113  -- 119 120  --    Heart Rate Source Monitor  --  -- Monitor  --    Resp 20  --  -- 18  --    Resp Rate Source Visual  --  -- Visual  --    /83  -- 141/90 141/90  --    BP Location Left arm  -- Right arm Left arm  --    BP Method Automatic  -- Automatic Automatic  --    Patient Position Lying  -- Sitting Sitting  --       Oxygen Therapy    Device (Oxygen Therapy)  --  --  --  -- nasal cannula    Flow (L/min)  --  --  --  -- 2    Row Name 07/02/18 19:45:52 07/02/18 1930 07/02/18 19:00:13 07/02/18 19:00:08 07/02/18 18:30:24       Vitals    Pulse 108  --  --  --  --    BP  -- 127/88  -- 125/88 120/84    Noninvasive MAP (mmHg)  -- 101  -- 98 95       Oxygen Therapy    SpO2  -- 94 % 92 %  --  --    Row Name 07/02/18 18:29:53 07/02/18 18:14:58 07/02/18 18:13:35 07/02/18 17:30:27 07/02/18 17:00:36       Vitals    Pulse  --  --  --  -- (!)  122    Heart Rate Source  --  --  --  -- Monitor    BP  --  -- 129/92 (!)  153/103 (!)  135/105    Noninvasive MAP (mmHg)  --  -- 102 111  --       Oxygen Therapy    SpO2 93 % 93 %  --  -- 95 %    Pulse Oximetry Type  --  --  --  -- Continuous    Device (Oxygen Therapy)  --  --  --  -- room air        Hospital Medications (all)       Dose Frequency Start End    buprenorphine-naloxone " "(SUBOXONE) 8-2 MG per SL tablet 1 tablet 1 tablet Daily 7/3/2018     Sig - Route: Place 1 tablet under the tongue Daily. - Sublingual    buprenorphine-naloxone (SUBOXONE) 8-2 MG per SL tablet 1 tablet 1 tablet Once 7/2/2018 7/2/2018    Sig - Route: Place 1 tablet under the tongue 1 (One) Time. - Sublingual    diazePAM (VALIUM) tablet 5 mg 5 mg Every 8 Hours 7/2/2018 7/12/2018    Sig - Route: Take 1 tablet by mouth Every 8 (Eight) Hours. - Oral    enoxaparin (LOVENOX) syringe 40 mg 40 mg Every 24 Hours Scheduled 7/3/2018     Sig - Route: Inject 0.4 mL under the skin Daily. - Subcutaneous    folic acid (FOLVITE) tablet 1 mg 1 mg Daily 7/3/2018 7/6/2018    Sig - Route: Take 1 tablet by mouth Daily. - Oral    Linked Group 1:  \"And\" Linked Group Details        iopamidol (ISOVUE-370) 76 % injection 100 mL 100 mL Once in Imaging 7/2/2018 7/2/2018    Sig - Route: Infuse 100 mL into a venous catheter Once. - Intravenous    LORazepam (ATIVAN) injection 1 mg 1 mg Every 2 Hours PRN 7/2/2018 7/12/2018    Sig - Route: Infuse 0.5 mL into a venous catheter Every 2 (Two) Hours As Needed for Withdrawal (For CIWA score 8-10). - Intravenous    Linked Group 2:  \"Or\" Linked Group Details        LORazepam (ATIVAN) injection 2 mg 2 mg Every 1 Hour PRN 7/2/2018 7/12/2018    Sig - Route: Infuse 1 mL into a venous catheter Every 1 (One) Hour As Needed for Withdrawal (For CIWA score 11-15). - Intravenous    Linked Group 2:  \"Or\" Linked Group Details        LORazepam (ATIVAN) injection 2 mg 2 mg Every 15 Minutes PRN 7/2/2018 7/12/2018    Sig - Route: Infuse 1 mL into a venous catheter Every 15 (Fifteen) Minutes As Needed for Anxiety (Use IV route first.  If unable to give IV, use IM.  For CIWA-Ar greater than 15.  Repeat dose in 15 minutes if CIWA-Ar is not decreasing.). - Intravenous    Linked Group 2:  \"Or\" Linked Group Details        LORazepam (ATIVAN) injection 2 mg 2 mg Every 15 Minutes PRN 7/2/2018 7/12/2018    Sig - Route: Inject 1 mL " "into the shoulder, thigh, or buttocks Every 15 (Fifteen) Minutes As Needed for Withdrawal (Use IV route first.  If unable to give IV, use IM.  For CIWA-Ar greater than 15.  Repeat dose in 15 minutes if CIWA-Ar is not decreasing.). - Intramuscular    Linked Group 2:  \"Or\" Linked Group Details        LORazepam (ATIVAN) tablet 1 mg 1 mg Every 2 Hours PRN 7/2/2018 7/12/2018    Sig - Route: Take 1 tablet by mouth Every 2 (Two) Hours As Needed for Withdrawal (For CIWA score 8-10). - Oral    Linked Group 2:  \"Or\" Linked Group Details        LORazepam (ATIVAN) tablet 2 mg 2 mg Every 1 Hour PRN 7/2/2018 7/12/2018    Sig - Route: Take 2 tablets by mouth Every 1 (One) Hour As Needed for Withdrawal (For CIWA score 11-15). - Oral    Linked Group 2:  \"Or\" Linked Group Details        Magnesium Sulfate 2 gram / 50mL Infusion (GIVE X 3 BAGS TO EQUAL 6GM TOTAL DOSE) - Mg 1.1 - 1/5 mg/dl 2 g As Needed 7/3/2018     Sig - Route: Infuse 50 mL into a venous catheter As Needed (See Administration Instructions). - Intravenous    Linked Group 3:  \"Or\" Linked Group Details        Magnesium Sulfate 2 gram Bolus, followed by 8 gram infusion (total Mg dose 10 grams)- Mg less than or equal to 1mg/dL 2 g As Needed 7/3/2018     Sig - Route: Infuse 50 mL into a venous catheter As Needed (See Administration Instructions). - Intravenous    Linked Group 3:  \"Or\" Linked Group Details        Magnesium Sulfate 4 gram infusion- Mg 1.6-1.9 mg/dL 4 g As Needed 7/3/2018     Sig - Route: Infuse 100 mL into a venous catheter As Needed (See Administration Instructions). - Intravenous    Linked Group 3:  \"Or\" Linked Group Details        melatonin sublingual tablet 5 mg 5 mg Nightly PRN 7/2/2018     Sig - Route: Place 1 tablet under the tongue At Night As Needed for Sleep. - Sublingual    multivitamin (THERAGRAN) tablet 1 tablet 1 tablet Daily 7/3/2018 7/6/2018    Sig - Route: Take 1 tablet by mouth Daily. - Oral    Linked Group 1:  \"And\" Linked Group Details     " "   nicotine (NICODERM CQ) 21 MG/24HR patch 1 patch 1 patch Every 24 Hours 7/3/2018     Sig - Route: Place 1 patch on the skin Daily. - Transdermal    nicotine (NICODERM CQ) 21 MG/24HR patch 1 patch 1 patch Once 7/2/2018 7/3/2018    Sig - Route: Place 1 patch on the skin 1 (One) Time. - Transdermal    ondansetron (ZOFRAN) injection 4 mg 4 mg Every 6 Hours PRN 7/2/2018     Sig - Route: Infuse 2 mL into a venous catheter Every 6 (Six) Hours As Needed for Nausea or Vomiting. - Intravenous    Linked Group 4:  \"Or\" Linked Group Details        ondansetron (ZOFRAN) tablet 4 mg 4 mg Every 6 Hours PRN 7/2/2018     Sig - Route: Take 1 tablet by mouth Every 6 (Six) Hours As Needed for Nausea or Vomiting. - Oral    Linked Group 4:  \"Or\" Linked Group Details        potassium chloride (KLOR-CON) packet 40 mEq 40 mEq As Needed 7/2/2018     Sig - Route: Take 40 mEq by mouth As Needed (potassium replacement, see admin instructions). - Oral    Linked Group 5:  \"Or\" Linked Group Details        potassium chloride (KLOR-CON) packet 40 mEq 40 mEq As Needed 7/3/2018     Sig - Route: Take 40 mEq by mouth As Needed (potassium replacement, see admin instructions). - Oral    potassium chloride (MICRO-K) CR capsule 40 mEq 40 mEq As Needed 7/2/2018     Sig - Route: Take 4 capsules by mouth As Needed (potassium replacement.  see admin instructions). - Oral    Linked Group 5:  \"Or\" Linked Group Details        potassium chloride (MICRO-K) CR capsule 40 mEq 40 mEq As Needed 7/3/2018     Sig - Route: Take 4 capsules by mouth As Needed (potassium replacement.  see admin instructions). - Oral    potassium chloride 10 mEq in 100 mL IVPB 10 mEq Every 1 Hour PRN 7/2/2018     Sig - Route: Infuse 100 mL into a venous catheter Every 1 (One) Hour As Needed (potassium protocol PERIPHERAL - see admin instructions). - Intravenous    Linked Group 5:  \"Or\" Linked Group Details        sodium chloride 0.9 % bolus 1,000 mL 1,000 mL Once 7/2/2018 7/2/2018    Sig - " "Route: Infuse 1,000 mL into a venous catheter 1 (One) Time. - Intravenous    sodium chloride 0.9 % flush 1-10 mL 1-10 mL As Needed 7/2/2018     Sig - Route: Infuse 1-10 mL into a venous catheter As Needed for Line Care. - Intravenous    sodium chloride 0.9 % flush 10 mL 10 mL As Needed 7/2/2018     Sig - Route: Infuse 10 mL into a venous catheter As Needed for Line Care. - Intravenous    Cosign for Ordering: Accepted by Reinier Raymond MD on 7/3/2018  1:35 AM    thiamine (B-1) 100 mg in sodium chloride 0.9 % 100 mL IVPB 100 mg Once 7/2/2018 7/2/2018    Sig - Route: Infuse 100 mg into a venous catheter 1 (One) Time. - Intravenous    Linked Group 6:  \"Or\" Linked Group Details        thiamine (B-1) 500 mg in sodium chloride 0.9 % 100 mL IVPB 500 mg Every 8 Hours 7/3/2018 7/6/2018    Sig - Route: Infuse 500 mg into a venous catheter Every 8 (Eight) Hours. - Intravenous    thiamine (VITAMIN B-1) tablet 100 mg 100 mg Once 7/2/2018 7/2/2018    Sig - Route: Take 1 tablet by mouth 1 (One) Time. - Oral    Linked Group 6:  \"Or\" Linked Group Details        amLODIPine (NORVASC) tablet 10 mg (Discontinued) 10 mg Daily 7/3/2018 7/3/2018    Sig - Route: Take 1 tablet by mouth Daily. - Oral    furosemide (LASIX) tablet 20 mg (Discontinued) 20 mg 2 Times Daily (Diuretics) 7/2/2018 7/3/2018    Sig - Route: Take 1 tablet by mouth 2 (Two) Times a Day. - Oral    thiamine (VITAMIN B-1) tablet 100 mg (Discontinued) 100 mg Daily 7/3/2018 7/3/2018    Sig - Route: Take 1 tablet by mouth Daily. - Oral    Linked Group 1:  \"And\" Linked Group Details                Lab Results (last 24 hours)     Procedure Component Value Units Date/Time    Hepatitis Panel, Acute [[id number]]  (Normal) Collected:  07/03/18 1043    Specimen:  Blood Updated:  07/03/18 1316     Hepatitis B Surface Ag Non-Reactive     Hep A IgM Non-Reactive     Comment: Results may be falsely decreased if patient taking Biotin.        Hep B C IgM Non-Reactive     Comment: Results " may be falsely decreased if patient taking Biotin.        Hepatitis C Ab Non-Reactive    Potassium [[id number]]  (Normal) Collected:  07/03/18 1043    Specimen:  Blood Updated:  07/03/18 1240     Potassium 3.7 mmol/L     Hepatitis B Surface Antibody [[id number]]  (Normal) Collected:  07/03/18 1043    Specimen:  Blood Updated:  07/03/18 1236     Hep B S Ab Non-Reactive    Hepatitis A Antibody, Total [[id number]] Collected:  07/03/18 1043    Specimen:  Blood Updated:  07/03/18 1109    Magnesium [[id number]]  (Abnormal) Collected:  07/03/18 0417    Specimen:  Blood Updated:  07/03/18 1032     Magnesium 1.2 (L) mg/dL     Comprehensive Metabolic Panel [[id number]]  (Abnormal) Collected:  07/03/18 0417    Specimen:  Blood Updated:  07/03/18 0746     Glucose 86 mg/dL      BUN 9 mg/dL      Creatinine 0.63 mg/dL      Sodium 135 mmol/L      Potassium 2.7 (C) mmol/L      Chloride 91 (L) mmol/L      CO2 37.0 (H) mmol/L      Calcium 7.9 (L) mg/dL      Total Protein 6.2 g/dL      Albumin 2.39 (L) g/dL      ALT (SGPT) 24 U/L      AST (SGOT) 172 (H) U/L      Alkaline Phosphatase 246 (H) U/L      Total Bilirubin 3.6 (H) mg/dL      eGFR Non African Amer 135 mL/min/1.73      Globulin 3.8 gm/dL      A/G Ratio 0.6 (L) g/dL      BUN/Creatinine Ratio 14.3     Anion Gap 7.0 mmol/L     Narrative:       [employer] Guidelines    Stage     Description        GFR  1         Normal or High     90+  2         Mild decrease      60-89  3         Moderate decrease  30-59  4         Severe decrease    15-29  5         Kidney failure     <15    CBC (No Diff) [[id number]]  (Abnormal) Collected:  07/03/18 0417    Specimen:  Blood Updated:  07/03/18 0613     WBC 8.35 10*3/mm3      RBC 3.20 (L) 10*6/mm3      Hemoglobin 11.6 (L) g/dL      Hematocrit 34.1 (L) %      .6 (H) fL      MCH 36.3 (H) pg      MCHC 34.0 g/dL      RDW 16.1 (H) %      RDW-SD 62.8 (H) fl      MPV 12.5 (H) fL      Platelets 138 (L) 10*3/mm3     Urine Drug Screen -  Urine, Clean Catch [[id number]]  (Abnormal) Collected:  07/02/18 1718    Specimen:  Urine from Urine, Clean Catch Updated:  07/03/18 0116     THC, Screen, Urine Negative     Phencyclidine (PCP), Urine Negative     Cocaine Screen, Urine Negative     Methamphetamine, Urine Negative     Opiate Screen Negative     Amphetamine Screen, Urine Negative     Benzodiazepine Screen, Urine Negative     Tricyclic Antidepressants Screen Negative     Methadone Screen, Urine Negative     Barbiturates Screen, Urine Negative     Oxycodone Screen, Urine Negative     Propoxyphene Screen Negative     Buprenorphine, Screen, Urine Positive (A)    Narrative:       Cutoff For Drugs Screened:    Amphetamines               500 ng/ml  Barbiturates               200 ng/ml  Benzodiazepines            150 ng/ml  Cocaine                    150 ng/ml  Methadone                  200 ng/ml  Opiates                    100 ng/ml  Phencyclidine               25 ng/ml  THC                            50 ng/ml  Methamphetamine            500 ng/ml  Tricyclic Antidepressants  300 ng/ml  Oxycodone                  100 ng/ml  Propoxyphene               300 ng/ml  Buprenorphine               10 ng/ml    The normal value for all drugs tested is negative. This report includes unconfirmed screening results, with the cutoff values listed, to be used for medical treatment purposes only.  Unconfirmed results must not be used for non-medical purposes such as employment or legal testing.  Clinical consideration should be applied to any drug of abuse test, particularly when unconfirmed results are used.      Urinalysis With Microscopic If Indicated (No Culture) - Urine, Clean Catch [[id number]]  (Abnormal) Collected:  07/02/18 1718    Specimen:  Urine from Urine, Clean Catch Updated:  07/02/18 1812     Color, UA Orange (A)     Appearance, UA Clear     pH, UA 6.5     Specific Gravity, UA 1.026     Glucose, UA Negative     Ketones, UA Negative     Bilirubin, UA Large (3+)  (A)     Blood, UA Negative     Protein, UA 30 mg/dL (1+) (A)     Leuk Esterase, UA Small (1+) (A)     Nitrite, UA Positive (A)     Urobilinogen, UA 4.0 E.U./dL (A)    Urinalysis, Microscopic Only - Urine, Clean Catch [[id number]] Collected:  07/02/18 1718    Specimen:  Urine from Urine, Clean Catch Updated:  07/02/18 1812     RBC, UA 0-2 /HPF      WBC, UA 0-2 /HPF      Bacteria, UA None Seen /HPF      Squamous Epithelial Cells, UA 0-2 /HPF      Hyaline Casts, UA 0-6 /LPF      Methodology Manual Light Microscopy    Ethanol [[id number]]  (Abnormal) Collected:  07/02/18 1230    Specimen:  Blood Updated:  07/02/18 1805     Ethanol 37 (H) mg/dL     Ammonia [[id number]]  (Normal) Collected:  07/02/18 1718    Specimen:  Blood Updated:  07/02/18 1749     Ammonia 31 umol/L     Protime-INR [[id number]]  (Abnormal) Collected:  07/02/18 1718    Specimen:  Blood Updated:  07/02/18 1747     Protime 13.8 (H) Seconds      INR 1.31 (H)    aPTT [[id number]]  (Normal) Collected:  07/02/18 1718    Specimen:  Blood Updated:  07/02/18 1747     PTT 26.4 seconds     Narrative:       PTT = The equivalent PTT values for the therapeutic range of heparin levels at 0.3 to 0.5 U/ml are 55 to 70 seconds.    CBC & Differential [[id number]] Collected:  07/02/18 1230    Specimen:  Blood Updated:  07/02/18 1408    Narrative:       The following orders were created for panel order CBC & Differential.  Procedure                               Abnormality         Status                     ---------                               -----------         ------                     Scan Slide[[id number]]                                                                   CBC Auto Differential[[id number]]         Abnormal            Final result                 Please view results for these tests on the individual orders.    CBC Auto Differential [[id number]]  (Abnormal) Collected:  07/02/18 1230    Specimen:  Blood Updated:  07/02/18 1408     WBC 14.39 (H) 10*3/mm3      RBC 3.87  (L) 10*6/mm3      Hemoglobin 14.2 g/dL      Hematocrit 40.8 %      .4 (H) fL      MCH 36.7 (H) pg      MCHC 34.8 g/dL      RDW 15.7 (H) %      RDW-SD 60.1 (H) fl      MPV 12.2 (H) fL      Platelets 160 10*3/mm3      Neutrophil % 86.5 (H) %      Lymphocyte % 6.2 (L) %      Monocyte % 7.0 %      Eosinophil % 0.1 %      Basophil % 0.2 %      Immature Grans % 0.3 %      Neutrophils, Absolute 12.45 (H) 10*3/mm3      Lymphocytes, Absolute 0.89 10*3/mm3      Monocytes, Absolute 1.01 (H) 10*3/mm3      Eosinophils, Absolute 0.01 10*3/mm3      Basophils, Absolute 0.03 10*3/mm3      Immature Grans, Absolute 0.05 (H) 10*3/mm3         Imaging Results (last 24 hours)     Procedure Component Value Units Date/Time    CT Abdomen Pelvis With & Without Contrast [[id number]] Collected:  07/03/18 0914     Updated:  07/03/18 0917    Narrative:       EXAMINATION: CT ABDOMEN AND PELVIS W WO CONTRAST-      INDICATION: Liver protocol - cirrhosis, ascites, diffuse abdominal pain,  vomiting.      TECHNIQUE: CT scan of the abdomen and pelvis was performed prior to and  following intravenous contrast.      The radiation dose reduction device was turned on for each scan per the  ALARA (As Low as Reasonably Achievable) protocol.     COMPARISON: None.     FINDINGS: The most superior images demonstrate no basilar pulmonary  inflammatory process or pleural effusion.      The liver demonstrates a severe pattern of diffuse fatty infiltration.  There is a trace of ascitic fluid. There is no adrenal mass. The  pancreas is normal. There is no renal mass, stone or obstruction.  There  is ascites. There is no aneurysm or retroperitoneal lymphadenopathy.       Impression:       There is severe diffuse fatty infiltration of the liver and  the spleen is at the upper limits of normal. There is ascites.     D:  07/02/2018  E:  07/03/2018        This report was finalized on 7/3/2018 9:15 AM by Dr. John Benedict MD.              Physician Progress Notes (last  24 hours) (Notes from 2018  1:42 PM through 7/3/2018  1:42 PM)      Ina Conway MD at 7/3/2018  6:54 AM              [facility] Medicine Services  PROGRESS NOTE    Patient Name: Sd Miller  : 1965  MRN: [id number]    Date of Admission: 2018  Length of Stay: 1  Primary Care Physician: No Known Provider    Subjective   Subjective     CC:  FU abdominal pain    HPI:  Wife at bedside. Pt reports having bad abdominal pain- epigastric and R inguinal region. +Dysuria this AM.    Review of Systems  Gen- No fevers, chills  CV- No chest pain, palpitations  Resp- No cough, dyspnea  GI- No N/V/D, abd pain    Otherwise ROS is negative except as mentioned in the HPI.    Objective   Objective     Vital Signs:   Temp:  [98.1 °F (36.7 °C)-99.6 °F (37.6 °C)] 98.2 °F (36.8 °C)  Heart Rate:  [] 92  Resp:  [18-22] 18  BP: ()/() 97/69        Physical Exam:  Constitutional: No acute distress, awake, alert on 2LNC  Eyes: PERRLA, sclerae anicteric, no conjunctival injection  HENT: NCAT, mucous membranes moist  Neck: Supple, no thyromegaly, no lymphadenopathy, trachea midline  Respiratory: Clear to auscultation bilaterally, nonlabored respirations   Cardiovascular: RRR, no murmurs, rubs, or gallops, palpable pedal pulses bilaterally  Gastrointestinal: Positive bowel sounds, soft, mild TTP in epigastric region and R inguinal region, nondistended  Musculoskeletal: No bilateral ankle edema, no clubbing or cyanosis to extremities  Psychiatric: Appropriate affect, cooperative  Neurologic: Oriented x 3, strength symmetric in all extremities, Cranial Nerves grossly intact to confrontation, speech clear  Skin: No rashes    Results Reviewed:  I have personally reviewed current lab, radiology, and data and agree.      Results from last 7 days  Lab Units 18  0417 18  1718 18  1230   WBC 10*3/mm3 8.35  --  14.39*   HEMOGLOBIN g/dL 11.6*  --  14.2   HEMATOCRIT % 34.1*  --   40.8   PLATELETS 10*3/mm3 138*  --  160   INR   --  1.31*  --        Results from last 7 days  Lab Units 07/03/18  0417 07/02/18  1230   SODIUM mmol/L 135 137   POTASSIUM mmol/L 2.7* 3.0*   CHLORIDE mmol/L 91* 89*   CO2 mmol/L 37.0* 38.0*   BUN mg/dL 9 7*   CREATININE mg/dL 0.63 0.68   GLUCOSE mg/dL 86 120*   CALCIUM mg/dL 7.9* 8.7   ALT (SGPT) U/L 24 30   AST (SGOT) U/L 172* 249*     No results found for: BNP  No results found for: PHART    Microbiology Results Abnormal     None          Imaging Results (last 24 hours)     Procedure Component Value Units Date/Time    CT Abdomen Pelvis With & Without Contrast [[id number]] Collected:  07/03/18 0914     Updated:  07/03/18 0917    Narrative:       EXAMINATION: CT ABDOMEN AND PELVIS W WO CONTRAST-      INDICATION: Liver protocol - cirrhosis, ascites, diffuse abdominal pain,  vomiting.      TECHNIQUE: CT scan of the abdomen and pelvis was performed prior to and  following intravenous contrast.      The radiation dose reduction device was turned on for each scan per the  ALARA (As Low as Reasonably Achievable) protocol.     COMPARISON: None.     FINDINGS: The most superior images demonstrate no basilar pulmonary  inflammatory process or pleural effusion.      The liver demonstrates a severe pattern of diffuse fatty infiltration.  There is a trace of ascitic fluid. There is no adrenal mass. The  pancreas is normal. There is no renal mass, stone or obstruction.  There  is ascites. There is no aneurysm or retroperitoneal lymphadenopathy.       Impression:       There is severe diffuse fatty infiltration of the liver and  the spleen is at the upper limits of normal. There is ascites.     D:  07/02/2018  E:  07/03/2018        This report was finalized on 7/3/2018 9:15 AM by Dr. John Benedict MD.           Results for orders placed during the hospital encounter of 09/08/15   Echo - Converted    Narrative   [address] [address]      [phone]         ECHO     [id number]         Signed        PATIENT NAME:  HOUSTON GUIDO MRN:  [id number]    :  1965 ACCT#:  [id number]    ATTENDING:  BHARTI LOPEZ MD ADMIT DATE:  09/08/15    PRIMARY CARE:  CHASITY RODRIGUEZ MD LOCATION:          CC:  MITCH HASKINS MD, MD; BHARTI LOPEZ MD  ; CHASITY RODRIGUEZ MD         DATE:       2015          ECHOCARDIOGRAM          INDICATIONS:  MI.           CARDIAC DIMENSIONS:      Left atrium is 4.5 cm     Aortic root of 3 cm.     Septal wall thickness is 0.9.     Posterior wall thickness 1.1.      Left ventricular end diastolic 5.6.      Left ventricular end systolic is 3.9.     Fractional shortening is 35%.     Ejection fraction of 65%.          COMMENTS:  Left ventricular wall thickness appears to be normal.  Left     ventricular dimensions are normal. Left ventricular function is normal. No     obvious wall motion abnormality seen.          Right ventricle appears to be of normal size.  RV function is normal.          ATRIA:  The left atrium is [id number] enlarged. Right atrium is of     normal size.  Interatrial septum is intact.  No flow across the same.           MITRAL VALVE:  Thickened mitral valve leaflets.  Trace mitral insufficiency.      Mitral  in-flow pattern is appropriate. The E:E prime ratio is 7 with a     calculated AP of 11.          AORTIC VALVE: Trileaflet, structurally normal. No Doppler flow is appreciated.           TRICUSPID VALVE:  Structurally normal.  Mild tricuspid insufficiency.      Pulmonary pressures are about 45 mmHg.          PULMONIC VALVE: Not well visualized.            AORTIC ROOT:  Is within normal limits. There is no pericardial effusion. There     is no pericardial thickening.  There is no intracardiac mass, thrombus, or     vegetation.  The IVC appears to be dilated at 2.5 cm.      CONCLUSIONS:      1.  Technically adequate study.     2.  Normal LV systolic function. (EF 65%).     3.   Mild-to-moderate left atrial enlargement with normal left ventricular end     diastolic pressures.     4.  Mild mitral insufficiency with thickened mitral valve leaflets.      5.  Mild tricuspid insufficiency with PA systolic of 45 mmHg.                          Mitch Haskins M.D.     AK/hollis     Job ID:   [id number]     Document ID: [id number]     Revised:  0                                      DICTATED BY:      MITCH HASKINS MD    DICTATED DATE/TIME:      09/08/15 1022    TRANSCRIBED DATE/TIME:      09/08/15 1247        SIGNED:         MITCH HASKINS MD          SIGNED DATE/TIME:      09/10/15 1347        COSIGNED:             COSIGNED DATE   TIME:               I have reviewed the medications.    Assessment/Plan   Assessment / Plan     Hospital Problem List     * (Principal)Alcoholic hepatitis    Hypertension    Alcohol use    Tobacco use    Leukocytosis    Opioid dependence (CMS/HCC)           Brief Hospital Course to date:  Sd Miller is a 52 y.o. male with past medical history of hypertension, opioid use, tobacco use, and alcohol use presents [facility] emergency department after being referred by his PCP for worsening evaluation of abdominal pain, distention, and fluid retention.    Assessment & Plan:    - Alcoholic hepatitis: Discriminant Function <32. Good prognosis. Mild ascites on CT A/P. Min fluid wave on exam. Likely not enough fluid for LVP. Will get diagnostic para if able  - EtOH use: Valium 5mg q8hrs + CIWA. High-dose thiamine. Folic acid  - Chronic pain: UDS +Suboxone. Obtain KAPER. Continue home Suboxone dose  - R inguinal hernia: Exquisitely painful. ?strangulation but that is not seen on CT A/P. Will consult Gen Surg. Appreciate seeing patient  - Hypokalemia: Replete. Obtain Mag    DVT Prophylaxis:  pLOV    CODE STATUS:   Code Status and Medical Interventions:   Ordered at: 07/02/18 2110     Level Of Support Discussed With:    Patient     Code Status:    CPR     Medical  Interventions (Level of Support Prior to Arrest):    Full       Disposition: I expect the patient to be discharged home TBKATHLEEN Conway MD  07/03/18  9:41 AM            Electronically signed by Ina Conway MD at 7/3/2018  9:54 AM       Consult Notes (last 24 hours) (Notes from 7/2/2018  1:42 PM through 7/3/2018  1:42 PM)

## 2018-07-03 NOTE — PLAN OF CARE
Problem: Alcohol Withdrawal Acute, Risk/Actual (Adult)  Intervention: Support/Optimize Psychosocial Response to Withdrawal Process   18 0855   Coping/Psychosocial Interventions   Supportive Measures --  active listening utilized;verbalization of feelings encouraged   Environmental Support calm environment promoted --    Pain/Comfort/Sleep Interventions   Sleep/Rest Enhancement relaxation techniques promoted;regular sleep/rest pattern promoted --      Intervention: Minimize/Manage Withdrawal Symptoms   18 0855 18 1250   Cognitive Interventions   Sensory Stimulation Regulation care clustered;lighting decreased;quiet environment promoted --  --    Positioning   Head of Bed (HOB) --  --  HOB elevated   Safety Interventions   Aspiration Precautions --  awake/alert before oral intake --    Seizure Precautions --  --  --    Safety Management   Environmental Safety Modification --  --  --    Prevent  Drop/Fall   Safety/Security Measures --  --  --     18 1300   Cognitive Interventions   Sensory Stimulation Regulation --    Positioning   Head of Bed (HOB) --    Safety Interventions   Aspiration Precautions --    Seizure Precautions activity supervised   Safety Management   Environmental Safety Modification assistive device/personal items within reach;clutter free environment maintained;lighting adjusted;room organization consistent   Prevent [place] Drop/Fall   Safety/Security Measures bed alarm set;family to remain at bedside       Goal: Signs and Symptoms of Listed Potential Problems Will be Absent, Minimized or Managed (Alcohol Withdrawal Acute, Risk/Actual)   18 0410   Goal/Outcome Evaluation   Problems Assessed (Alcohol Withdrawal Syndrome) all   Problems Present (Alcohol W/D Syndrome) effects of YON (alcohol withdrawal syndrome)

## 2018-07-03 NOTE — PLAN OF CARE
Problem: Alcohol Withdrawal Acute, Risk/Actual (Adult)  Goal: Signs and Symptoms of Listed Potential Problems Will be Absent, Minimized or Managed (Alcohol Withdrawal Acute, Risk/Actual)  Outcome: Ongoing (interventions implemented as appropriate)   07/03/18 6085   Goal/Outcome Evaluation   Problems Assessed (Alcohol Withdrawal Syndrome) all   Problems Present (Alcohol W/D Syndrome) effects of YON (alcohol withdrawal syndrome)

## 2018-07-03 NOTE — PROGRESS NOTES
Clinical Nutrition   Reason For Visit: Identified at risk by screening criteria, MST score 2+, Reduced oral intake    Patient Name: Sd Miller  YOB: 1965  MRN: [id number]  Date of Encounter: 07/03/18 3:15 PM  Admission date: 7/2/2018      Nutrition Assessment     Hospital Problem List  Principal Problem:    Alcoholic hepatitis  Active Problems:    Hypertension    Alcohol use    Tobacco use    Leukocytosis    Opioid dependence (CMS/HCC)        PMH: He  has a past medical history of Hypertension.   PSxH: He  has no past surgical history on file.       Other Applicable Diagnosis:  Alcoholic cirrhosis (new dx)  Right inguinal hernia  Mild ascites    Applicable medical tests/procedures since admission:  Paracentesis today - pending       Reported/Observed/Food/Nutrition Related History   Patient and significant other present. Patient reports decreased appetite with little to no PO intake (<50% of usual) x 1 week PTA. Likely has had unintentional wt loss as a result, but unsure of amount especially since he has fluid retention at this time. Transport staff arrived to transport patient for paracentesis procedure. Informed patient that RD would follow-up to discuss nutrition regarding cirrhosis and oral nutrition supplements.      Anthropometrics   Height: 68 in  Weight: 157 lbs (standing wt 7/2 per nsg documentation) - RD notes this is likely overestimating dry weight 2/2 mild ascites on admission  BMI: 24.0  BMI classification: Normal: 18.5-24.9kg/m2   UBW: 155 lbs (per pt)    Weight change: RD unable to determine accurate weight loss at this time, will order standing wt post-paracentesis        Labs reviewed   Labs reviewed: Yes    Medications reviewed   Medications reviewed: Yes  Pertinent: MVI, thiamine      Current Nutrition Prescription   PO: NPO Diet      Nutrition Diagnosis     Problem Inadequate oral intake   Etiology Clinical condition   Signs/Symptoms NPO status         Intervention    Intervention: Follow treatment progress, Care plan reviewed, Await begin PO      Goal:   General: Nutrition support treatment  PO: Initiate diet      Monitoring/Evaluation:       Monitoring/Evaluation: Per protocol, Weight  Will Continue to follow per protocol  Maricel Charels RD  Time Spent: 20 min

## 2018-07-03 NOTE — PAYOR COMM NOTE
"Houston Guido (52 y.o. Male)     Date of Birth Social Security Number Address Home Phone MRN    1965  [address] [facility] [address] [phone] [id number]    [facility] Status          None        Admission Date Admission Type Admitting Provider Attending Provider Department, Room/Bed    7/2/18 Emergency Vonda Best MD [place], Vonda Taylor MD [address], S566/1    Discharge Date Discharge Disposition Discharge Destination                       Attending Provider:  Vonda Best MD    Allergies:  Lipitor [Atorvastatin]    Isolation:  None   Infection:  None   Code Status:  CPR    Ht:  172.7 cm (68\")   Wt:  72.1 kg (159 lb)    Admission Cmt:  None   Principal Problem:  Cirrhosis (CMS/HCC) [K74.60]                 Active Insurance as of 7/2/2018     Primary Coverage     Payor Plan Insurance Group Employer/Plan Group    PASSPORT PASSPORT MEDICAID     Payor Plan Address Payor Plan Phone Number Effective From Effective To    [address] [phone] 7/2/2018     [facility] [address]       Subscriber Name Subscriber Birth Date Member ID       HOUSTON GUIDO 1965 [id number]                 Emergency Contacts      (Rel.) Home Phone Work Phone Mobile Phone    Rachel Guido (Spouse) [phone] -- --            Insurance Information                PASSPORT/PASSPORT Phone: [phone]    Subscriber: Houston Guido Subscriber#: [id number]    Group#: MEDICAID Precert#:           History & Physical     No notes of this type exist for this encounter.              ICU Vital Signs     Row Name 07/02/18 19:45:52 07/02/18 1930 07/02/18 19:00:13 07/02/18 19:00:08 07/02/18 18:30:24       Vitals    Pulse 108  --  --  --  --    BP  -- 127/88  -- 125/88 120/84    Noninvasive MAP (mmHg)  -- 101  -- 98 95       Oxygen Therapy    SpO2  -- 94 % 92 %  --  --    Row Name 07/02/18 18:29:53 07/02/18 18:14:58 07/02/18 18:13:35 07/02/18 17:30:27 07/02/18 " "17:00:36       Vitals    Pulse  --  --  --  -- (!)  122    Heart Rate Source  --  --  --  -- Monitor    BP  --  -- 129/92 (!)  153/103 (!)  135/105    Noninvasive MAP (mmHg)  --  -- 102 111  --       Oxygen Therapy    SpO2 93 % 93 %  --  -- 95 %    Pulse Oximetry Type  --  --  --  -- Continuous    Device (Oxygen Therapy)  --  --  --  -- room air    Row Name 07/02/18 1203                   Height and Weight    Height 172.7 cm (68\")        Height Method Stated        Weight 72.1 kg (159 lb)        Weight Method Stated        Ideal Body Weight (IBW) (kg) 70.89        BSA (Calculated - sq m) 1.85 sq meters        BMI (Calculated) 24.2        Weight in (lb) to have BMI = 25 164.1           Vitals    Temp 98.1 °F (36.7 °C)        Temp src Oral        Pulse (!)  135        Heart Rate Source Monitor        Resp 22        Resp Rate Source Visual        /94        BP Location Left arm        BP Method Automatic        Patient Position Sitting           Oxygen Therapy    SpO2 97 %        Pulse Oximetry Type Intermittent            Hospital Medications (all)       Dose Frequency Start End    amLODIPine (NORVASC) tablet 10 mg 10 mg Daily 7/3/2018     Sig - Route: Take 1 tablet by mouth Daily. - Oral    buprenorphine-naloxone (SUBOXONE) 8-2 MG per SL tablet 1 tablet 1 tablet Daily 7/3/2018     Sig - Route: Place 1 tablet under the tongue Daily. - Sublingual    buprenorphine-naloxone (SUBOXONE) 8-2 MG per SL tablet 1 tablet 1 tablet Once 7/2/2018 7/2/2018    Sig - Route: Place 1 tablet under the tongue 1 (One) Time. - Sublingual    diazePAM (VALIUM) tablet 5 mg 5 mg Every 8 Hours 7/2/2018 7/12/2018    Sig - Route: Take 1 tablet by mouth Every 8 (Eight) Hours. - Oral    folic acid (FOLVITE) tablet 1 mg 1 mg Daily 7/3/2018 7/6/2018    Sig - Route: Take 1 tablet by mouth Daily. - Oral    Linked Group 1:  \"And\" Linked Group Details        furosemide (LASIX) tablet 20 mg 20 mg 2 Times Daily (Diuretics) 7/2/2018     Sig - Route: " "Take 1 tablet by mouth 2 (Two) Times a Day. - Oral    iopamidol (ISOVUE-370) 76 % injection 100 mL 100 mL Once in Imaging 7/2/2018 7/2/2018    Sig - Route: Infuse 100 mL into a venous catheter Once. - Intravenous    LORazepam (ATIVAN) injection 1 mg 1 mg Every 2 Hours PRN 7/2/2018 7/12/2018    Sig - Route: Infuse 0.5 mL into a venous catheter Every 2 (Two) Hours As Needed for Withdrawal (For CIWA score 8-10). - Intravenous    Linked Group 2:  \"Or\" Linked Group Details        LORazepam (ATIVAN) injection 2 mg 2 mg Every 1 Hour PRN 7/2/2018 7/12/2018    Sig - Route: Infuse 1 mL into a venous catheter Every 1 (One) Hour As Needed for Withdrawal (For CIWA score 11-15). - Intravenous    Linked Group 2:  \"Or\" Linked Group Details        LORazepam (ATIVAN) injection 2 mg 2 mg Every 15 Minutes PRN 7/2/2018 7/12/2018    Sig - Route: Infuse 1 mL into a venous catheter Every 15 (Fifteen) Minutes As Needed for Anxiety (Use IV route first.  If unable to give IV, use IM.  For CIWA-Ar greater than 15.  Repeat dose in 15 minutes if CIWA-Ar is not decreasing.). - Intravenous    Linked Group 2:  \"Or\" Linked Group Details        LORazepam (ATIVAN) injection 2 mg 2 mg Every 15 Minutes PRN 7/2/2018 7/12/2018    Sig - Route: Inject 1 mL into the shoulder, thigh, or buttocks Every 15 (Fifteen) Minutes As Needed for Withdrawal (Use IV route first.  If unable to give IV, use IM.  For CIWA-Ar greater than 15.  Repeat dose in 15 minutes if CIWA-Ar is not decreasing.). - Intramuscular    Linked Group 2:  \"Or\" Linked Group Details        LORazepam (ATIVAN) tablet 1 mg 1 mg Every 2 Hours PRN 7/2/2018 7/12/2018    Sig - Route: Take 1 tablet by mouth Every 2 (Two) Hours As Needed for Withdrawal (For CIWA score 8-10). - Oral    Linked Group 2:  \"Or\" Linked Group Details        LORazepam (ATIVAN) tablet 2 mg 2 mg Every 1 Hour PRN 7/2/2018 7/12/2018    Sig - Route: Take 2 tablets by mouth Every 1 (One) Hour As Needed for Withdrawal (For CIWA score " "11-15). - Oral    Linked Group 2:  \"Or\" Linked Group Details        multivitamin (THERAGRAN) tablet 1 tablet 1 tablet Daily 7/3/2018 7/6/2018    Sig - Route: Take 1 tablet by mouth Daily. - Oral    Linked Group 1:  \"And\" Linked Group Details        nicotine (NICODERM CQ) 21 MG/24HR patch 1 patch 1 patch Every 24 Hours 7/3/2018     Sig - Route: Place 1 patch on the skin Daily. - Transdermal    nicotine (NICODERM CQ) 21 MG/24HR patch 1 patch 1 patch Once 7/2/2018     Sig - Route: Place 1 patch on the skin 1 (One) Time. - Transdermal    ondansetron (ZOFRAN) injection 4 mg 4 mg Every 6 Hours PRN 7/2/2018     Sig - Route: Infuse 2 mL into a venous catheter Every 6 (Six) Hours As Needed for Nausea or Vomiting. - Intravenous    Linked Group 3:  \"Or\" Linked Group Details        ondansetron (ZOFRAN) tablet 4 mg 4 mg Every 6 Hours PRN 7/2/2018     Sig - Route: Take 1 tablet by mouth Every 6 (Six) Hours As Needed for Nausea or Vomiting. - Oral    Linked Group 3:  \"Or\" Linked Group Details        sodium chloride 0.9 % bolus 1,000 mL 1,000 mL Once 7/2/2018 7/2/2018    Sig - Route: Infuse 1,000 mL into a venous catheter 1 (One) Time. - Intravenous    sodium chloride 0.9 % flush 1-10 mL 1-10 mL As Needed 7/2/2018     Sig - Route: Infuse 1-10 mL into a venous catheter As Needed for Line Care. - Intravenous    sodium chloride 0.9 % flush 10 mL 10 mL As Needed 7/2/2018     Sig - Route: Infuse 10 mL into a venous catheter As Needed for Line Care. - Intravenous    Cosign for Ordering: Required by Reinier Raymond MD    thiamine (B-1) 100 mg in sodium chloride 0.9 % 100 mL IVPB 100 mg Once 7/2/2018     Sig - Route: Infuse 100 mg into a venous catheter 1 (One) Time. - Intravenous    Linked Group 4:  \"Or\" Linked Group Details        thiamine (VITAMIN B-1) tablet 100 mg 100 mg Once 7/2/2018 7/2/2018    Sig - Route: Take 1 tablet by mouth 1 (One) Time. - Oral    Linked Group 4:  \"Or\" Linked Group Details        thiamine (VITAMIN B-1) " "tablet 100 mg 100 mg Daily 7/3/2018 7/6/2018    Sig - Route: Take 1 tablet by mouth Daily. - Oral    Linked Group 1:  \"And\" Linked Group Details              Orders (last 24 hrs)     Start     Ordered    07/03/18 0900  amLODIPine (NORVASC) tablet 10 mg  Daily      07/02/18 2110 07/03/18 0900  nicotine (NICODERM CQ) 21 MG/24HR patch 1 patch  Every 24 Hours      07/02/18 2110 07/03/18 0900  thiamine (VITAMIN B-1) tablet 100 mg  Daily      07/02/18 2112 07/03/18 0900  multivitamin (THERAGRAN) tablet 1 tablet  Daily      07/02/18 2112 07/03/18 0900  folic acid (FOLVITE) tablet 1 mg  Daily      07/02/18 2112 07/03/18 0900  buprenorphine-naloxone (SUBOXONE) 8-2 MG per SL tablet 1 tablet  Daily      07/02/18 2112 07/03/18 0600  CBC (No Diff)  Morning Draw      07/02/18 2110 07/03/18 0600  Basic Metabolic Panel  Morning Draw      07/02/18 2110 07/03/18 0001  NPO Diet  Diet Effective Midnight      07/02/18 2110 07/03/18 0000  Vital Signs  Every 4 Hours      07/02/18 2110 07/02/18 2215  buprenorphine-naloxone (SUBOXONE) 8-2 MG per SL tablet 1 tablet  Once      07/02/18 2133 07/02/18 2200  diazePAM (VALIUM) tablet 5 mg  Every 8 Hours      07/02/18 2113 07/02/18 2145  furosemide (LASIX) tablet 20 mg  2 Times Daily (Diuretics)      07/02/18 2110 07/02/18 2145  thiamine (B-1) 100 mg in sodium chloride 0.9 % 100 mL IVPB  Once      07/02/18 2112 07/02/18 2145  thiamine (VITAMIN B-1) tablet 100 mg  Once      07/02/18 2112 07/02/18 2145  nicotine (NICODERM CQ) 21 MG/24HR patch 1 patch  Once      07/02/18 2114 07/02/18 2113  LORazepam (ATIVAN) tablet 1 mg  Every 2 Hours PRN      07/02/18 2113 07/02/18 2113  LORazepam (ATIVAN) injection 1 mg  Every 2 Hours PRN      07/02/18 2113 07/02/18 2113  LORazepam (ATIVAN) tablet 2 mg  Every 1 Hour PRN      07/02/18 2113 07/02/18 2113  LORazepam (ATIVAN) injection 2 mg  Every 1 Hour PRN      07/02/18 2113 07/02/18 2113  LORazepam " (ATIVAN) injection 2 mg  Every 15 Minutes PRN      07/02/18 2113 07/02/18 2113  LORazepam (ATIVAN) injection 2 mg  Every 15 Minutes PRN      07/02/18 2113 07/02/18 2112  Safety Precautions  Continuous      07/02/18 2112 07/02/18 2112  Obtain Baseline Clinical [place] Withdrawal Assessment - Ar, Sedation Scale & Vital Signs  Once     Comments:  Follow CIWA Scoring Reference Guide    07/02/18 2112 07/02/18 2112  Clinical [place] Withdrawal Assessment (CIWA-Ar)  Per Hospital Policy      07/02/18 2112 07/02/18 2112  If CIWA Score < 8 Monitor q4 hours x 24 hours And Then Discontinue Assessment- Restart Scoring Assessment & Protocol if Symptoms Reappear  Continuous      07/02/18 2112 07/02/18 2112  Obtain Pre & Post Sedation Scores With Every Lorazepam Dose - Hold For POSS Greater Than 2 or RASS of -3 or Less  Continuous      07/02/18 2112 07/02/18 2112  Seizure Precautions  Continuous      07/02/18 2112 07/02/18 2112  Notify Provider - Withdrawal  Until Discontinued     Comments:  Including: anxiety, agitation, severe vomiting, seizure activity.    07/02/18 2112 07/02/18 2112  Notify Provider of Abnormal Lab Results  Until Discontinued      07/02/18 2112 07/02/18 2112  Notify Provider - Vitals  Until Discontinued      07/02/18 2112 07/02/18 2111  Weigh Patient  Daily      07/02/18 2110 07/02/18 2111  Remove & Replace Compression Stockings (TEDS) Daily  Daily      07/02/18 2110 07/02/18 2107  Scrotal Elevation  Nursing Communication  Once     Comments:  Scrotal Elevation    07/02/18 2110 07/02/18 2107  Tobacco Cessation Education  Prior to Discharge      07/02/18 2110 07/02/18 2105  ondansetron (ZOFRAN) tablet 4 mg  Every 6 Hours PRN      07/02/18 2110 07/02/18 2105  ondansetron (ZOFRAN) injection 4 mg  Every 6 Hours PRN      07/02/18 2110 07/02/18 2104  CT Guided Paracentesis  1 Time Imaging      07/02/18 2110 07/02/18 2100  Strict Intake and Output  Every Hour       07/02/18 2110 07/02/18 2054  Inpatient Case Management  Consult  Once     Provider:  (Not yet assigned)    07/02/18 2110 07/02/18 2049  Diet Regular; Cardiac  Diet Effective Now      07/02/18 2110 07/02/18 2049  Inpatient Gastroenterology Consult  Once     Specialty:  Gastroenterology  Provider:  (Not yet assigned)    07/02/18 2110 07/02/18 2048  Fall Precautions  Continuous      07/02/18 2110 07/02/18 2048  Incentive Spirometry  Every Hour While Awake      07/02/18 2110 07/02/18 2047  Oxygen Therapy- Nasal Cannula; Titrate for SPO2: 90%  Continuous      07/02/18 2110 07/02/18 2047  Insert Peripheral IV  Once      07/02/18 2110 07/02/18 2047  Saline Lock & Maintain IV Access  Continuous      07/02/18 2110 07/02/18 2047  Code Status and Medical Interventions:  Continuous      07/02/18 2110 07/02/18 2047  VTE Risk Assessment - Moderate Risk  Once      07/02/18 2110 07/02/18 2047  Pharmacologic VTE Prophylaxis Not Indicated: Anticipated Invasive Procedure / Surgery  Once      07/02/18 2110 07/02/18 2047  Place Compression Stockings (TEDs)  Once      07/02/18 2110 07/02/18 2047  Place Sequential Compression Device  Once      07/02/18 2110 07/02/18 2047  Maintain Sequential Compression Device  Continuous      07/02/18 2110 07/02/18 2047  Cardiac Monitoring  Continuous      07/02/18 2110 07/02/18 2047  Pulse Oximetry, Continuous  Continuous      07/02/18 2110 07/02/18 2046  sodium chloride 0.9 % flush 1-10 mL  As Needed      07/02/18 2110 07/02/18 1926  Inpatient Admission  Once      07/02/18 1926    07/02/18 1915  sodium chloride 0.9 % bolus 1,000 mL  Once      07/02/18 1913    07/02/18 1800  iopamidol (ISOVUE-370) 76 % injection 100 mL  Once in Imaging      07/02/18 1758    07/02/18 1731  Urinalysis, Microscopic Only - Urine, Clean Catch  Once      07/02/18 1730    07/02/18 1703  CT Abdomen Pelvis With & Without Contrast  1 Time Imaging      Comments:  IV CONTRAST ONLY      07/02/18 1701    07/02/18 1701  CT Abdomen Pelvis With Contrast  1 Time Imaging,   Status:  Canceled     Comments:  IV CONTRAST ONLY      07/02/18 1701    07/02/18 1700  Protime-INR  Once      07/02/18 1701    07/02/18 1700  aPTT  Once      07/02/18 1701    07/02/18 1700  Ethanol  Once      07/02/18 1701    07/02/18 1700  Ammonia  STAT      07/02/18 1701    07/02/18 1242  Scan Slide  Once,   Status:  Canceled      07/02/18 1241    07/02/18 1214  Urinalysis With Microscopic If Indicated (No Culture) - Urine, Clean Catch  Once      07/02/18 1213    07/02/18 1213  NPO Diet  Diet Effective Now,   Status:  Canceled      07/02/18 1213    07/02/18 1213  Undress and Gown  Once      07/02/18 1213    07/02/18 1213  Insert peripheral IV  Once      07/02/18 1213    07/02/18 1213  [place] Draw  Once      07/02/18 1213    07/02/18 1213  CBC & Differential  Once      07/02/18 1213    07/02/18 1213  Comprehensive Metabolic Panel  Once      07/02/18 1213    07/02/18 1213  Lipase  Once      07/02/18 1213    07/02/18 1213  Light Blue Top  PROCEDURE ONCE      07/02/18 1213    07/02/18 1213  Green Top (Gel)  PROCEDURE ONCE      07/02/18 1213    07/02/18 1213  Lavender Top  PROCEDURE ONCE      07/02/18 1213    07/02/18 1213  Gold Top - SST  PROCEDURE ONCE      07/02/18 1213    07/02/18 1213  Green Top (No Gel)  PROCEDURE ONCE,   Status:  Canceled      07/02/18 1213    07/02/18 1213  CBC Auto Differential  PROCEDURE ONCE      07/02/18 1213    07/02/18 1212  sodium chloride 0.9 % flush 10 mL  As Needed      07/02/18 1213    --  amLODIPine (NORVASC) 10 MG tablet  Daily      07/02/18 1755    --  hydrochlorothiazide (HYDRODIURIL) 12.5 MG tablet  Daily      07/02/18 1755    --  buprenorphine-naloxone (SUBOXONE) 8-2 MG per SL tablet  Daily      07/02/18 1755    --  furosemide (LASIX) 20 MG tablet  2 Times Daily      07/02/18 1755    --  oxyCODONE-acetaminophen (PERCOCET)  MG per tablet  Every 6 Hours PRN       07/02/18 1755    Signed and Held  Obtain Informed Consent  Once      Signed and Held    Signed and Held  Non-gynecologic Cytology  STAT      Signed and Held    Signed and Held  Lactate Dehydrogenase, Body Fluid - Body Fluid, Peritoneum  STAT      Signed and Held    Signed and Held  Glucose, Body Fluid - Body Fluid, Peritoneum  STAT      Signed and Held    Signed and Held  Gram Stain  STAT      Signed and Held    Signed and Held  Protein, Body Fluid - Body Fluid, Peritoneum  STAT      Signed and Held    Signed and Held  Body Fluid Culture - Body Fluid, Peritoneum  Once      Signed and Held    Signed and Held  Anaerobic Culture - Body Fluid, Peritoneum  STAT      Signed and Held

## 2018-07-03 NOTE — PROGRESS NOTES
Chart reviewed. Patient off the floor for paracentesis when attempted to see him earlier today. Ascites fluid appears clear, .    Full consult in AM.

## 2018-07-03 NOTE — PROGRESS NOTES
[facility] Medicine Services  PROGRESS NOTE    Patient Name: Sd Miller  : 1965  MRN: [id number]    Date of Admission: 2018  Length of Stay: 1  Primary Care Physician: No Known Provider    Subjective   Subjective     CC:  FU abdominal pain    HPI:  Wife at bedside. Pt reports having bad abdominal pain- epigastric and R inguinal region. +Dysuria this AM.    Review of Systems  Gen- No fevers, chills  CV- No chest pain, palpitations  Resp- No cough, dyspnea  GI- No N/V/D, abd pain    Otherwise ROS is negative except as mentioned in the HPI.    Objective   Objective     Vital Signs:   Temp:  [98.1 °F (36.7 °C)-99.6 °F (37.6 °C)] 98.2 °F (36.8 °C)  Heart Rate:  [] 92  Resp:  [18-22] 18  BP: ()/() 97/69        Physical Exam:  Constitutional: No acute distress, awake, alert on 2LNC  Eyes: PERRLA, sclerae anicteric, no conjunctival injection  HENT: NCAT, mucous membranes moist  Neck: Supple, no thyromegaly, no lymphadenopathy, trachea midline  Respiratory: Clear to auscultation bilaterally, nonlabored respirations   Cardiovascular: RRR, no murmurs, rubs, or gallops, palpable pedal pulses bilaterally  Gastrointestinal: Positive bowel sounds, soft, mild TTP in epigastric region and R inguinal region, nondistended  Musculoskeletal: No bilateral ankle edema, no clubbing or cyanosis to extremities  Psychiatric: Appropriate affect, cooperative  Neurologic: Oriented x 3, strength symmetric in all extremities, Cranial Nerves grossly intact to confrontation, speech clear  Skin: No rashes    Results Reviewed:  I have personally reviewed current lab, radiology, and data and agree.      Results from last 7 days  Lab Units 18  0417 18  1718 18  1230   WBC 10*3/mm3 8.35  --  14.39*   HEMOGLOBIN g/dL 11.6*  --  14.2   HEMATOCRIT % 34.1*  --  40.8   PLATELETS 10*3/mm3 138*  --  160   INR   --  1.31*  --        Results from last 7 days  Lab Units 18  5555  18  1230   SODIUM mmol/L 135 137   POTASSIUM mmol/L 2.7* 3.0*   CHLORIDE mmol/L 91* 89*   CO2 mmol/L 37.0* 38.0*   BUN mg/dL 9 7*   CREATININE mg/dL 0.63 0.68   GLUCOSE mg/dL 86 120*   CALCIUM mg/dL 7.9* 8.7   ALT (SGPT) U/L 24 30   AST (SGOT) U/L 172* 249*     No results found for: BNP  No results found for: PHART    Microbiology Results Abnormal     None          Imaging Results (last 24 hours)     Procedure Component Value Units Date/Time    CT Abdomen Pelvis With & Without Contrast [[id number]] Collected:  18     Updated:  18    Narrative:       EXAMINATION: CT ABDOMEN AND PELVIS W WO CONTRAST-      INDICATION: Liver protocol - cirrhosis, ascites, diffuse abdominal pain,  vomiting.      TECHNIQUE: CT scan of the abdomen and pelvis was performed prior to and  following intravenous contrast.      The radiation dose reduction device was turned on for each scan per the  ALARA (As Low as Reasonably Achievable) protocol.     COMPARISON: None.     FINDINGS: The most superior images demonstrate no basilar pulmonary  inflammatory process or pleural effusion.      The liver demonstrates a severe pattern of diffuse fatty infiltration.  There is a trace of ascitic fluid. There is no adrenal mass. The  pancreas is normal. There is no renal mass, stone or obstruction.  There  is ascites. There is no aneurysm or retroperitoneal lymphadenopathy.       Impression:       There is severe diffuse fatty infiltration of the liver and  the spleen is at the upper limits of normal. There is ascites.     D:  2018  E:  2018        This report was finalized on 7/3/2018 9:15 AM by Dr. John Benedict MD.           Results for orders placed during the hospital encounter of 09/08/15   Echo - Converted    Narrative   [address] [address]     [phone]         ECHO     [id number]         Signed        PATIENT NAME:  HOUSTON GUIDO MRN:  [id number]    :   1965 [facility]#:  [id number]    ATTENDING:  BHARTI LOPEZ MD ADMIT DATE:  09/08/15    PRIMARY CARE:  CHASITY RODRIGUEZ MD LOCATION:          CC:  MITCH HASKINS MD, MD; BHARTI LOPEZ MD  ; CHASITY RODRIGUEZ MD         DATE:       09/08/2015          ECHOCARDIOGRAM          INDICATIONS:  MI.           CARDIAC DIMENSIONS:      Left atrium is 4.5 cm     Aortic root of 3 cm.     Septal wall thickness is 0.9.     Posterior wall thickness 1.1.      Left ventricular end diastolic 5.6.      Left ventricular end systolic is 3.9.     Fractional shortening is 35%.     Ejection fraction of 65%.          COMMENTS:  Left ventricular wall thickness appears to be normal.  Left     ventricular dimensions are normal. Left ventricular function is normal. No     obvious wall motion abnormality seen.          Right ventricle appears to be of normal size.  RV function is normal.          ATRIA:  The left atrium is [id number]-[id number] enlarged. Right atrium is of     normal size.  Interatrial septum is intact.  No flow across the same.           MITRAL VALVE:  Thickened mitral valve leaflets.  Trace mitral insufficiency.      Mitral  in-flow pattern is appropriate. The E:E prime ratio is 7 with a     calculated AP of 11.          AORTIC VALVE: Trileaflet, structurally normal. No Doppler flow is appreciated.           TRICUSPID VALVE:  Structurally normal.  Mild tricuspid insufficiency.      Pulmonary pressures are about 45 mmHg.          PULMONIC VALVE: Not well visualized.            AORTIC ROOT:  Is within normal limits. There is no pericardial effusion. There     is no pericardial thickening.  There is no intracardiac mass, thrombus, or     vegetation.  The IVC appears to be dilated at 2.5 cm.      CONCLUSIONS:      1.  Technically adequate study.     2.  Normal LV systolic function. (EF 65%).     3.  Mild-to-moderate left atrial enlargement with normal left ventricular end     diastolic pressures.     4.  Mild mitral insufficiency  with thickened mitral valve leaflets.      5.  Mild tricuspid insufficiency with PA systolic of 45 mmHg.                          Mitch Haskins M.D.     AK/hollis     Job ID:   [id number]     Document ID: [id number]     Revised:  0                                      DICTATED BY:      MITCH HASKINS MD    DICTATED DATE/TIME:      09/08/15 1022    TRANSCRIBED DATE/TIME:      09/08/15 1247        SIGNED:         MITCH HASKINS MD          SIGNED DATE/TIME:      09/10/15 1347        COSIGNED:             COSIGNED DATE   TIME:               I have reviewed the medications.    Assessment/Plan   Assessment / Plan     Hospital Problem List     * (Principal)Alcoholic hepatitis    Hypertension    Alcohol use    Tobacco use    Leukocytosis    Opioid dependence (CMS/HCC)           Brief Hospital Course to date:  Sd Miller is a 52 y.o. male with past medical history of hypertension, opioid use, tobacco use, and alcohol use presents [facility] emergency department after being referred by his PCP for worsening evaluation of abdominal pain, distention, and fluid retention.    Assessment & Plan:    - Alcoholic hepatitis: Discriminant Function <32. Good prognosis. Mild ascites on CT A/P. Min fluid wave on exam. Likely not enough fluid for LVP. Will get diagnostic para if able  - EtOH use: Valium 5mg q8hrs + CIWA. High-dose thiamine. Folic acid  - Chronic pain: UDS +Suboxone. Obtain KAPER. Continue home Suboxone dose  - R inguinal hernia: Exquisitely painful. ?strangulation but that is not seen on CT A/P. Will consult Gen Surg. Appreciate seeing patient  - Hypokalemia: Replete. Obtain Mag    DVT Prophylaxis:  pLOV    CODE STATUS:   Code Status and Medical Interventions:   Ordered at: 07/02/18 2110     Level Of Support Discussed With:    Patient     Code Status:    CPR     Medical Interventions (Level of Support Prior to Arrest):    Full       Disposition: I expect the patient to be discharged home NAHOMY Conway  MD  07/03/18  9:41 AM

## 2018-07-03 NOTE — PROGRESS NOTES
Discharge Planning Assessment  [facility]     Patient Name: Sd Miller  MRN: [id number]  Today's Date: 7/3/2018    Admit Date: 7/2/2018          Discharge Needs Assessment     Row Name 07/03/18 1229       Living Environment    Lives With spouse;child(shantanu), dependent;grandchild(shantanu)    Name(s) of Who Lives With Patient Spouse- Rachel, 17 and 15 year old children, and 15 month old grandchild    Current Living Arrangements home/apartment/condo    Primary Care Provided by self    Provides Primary Care For child(shantanu);grandchild(shantanu)    Family Caregiver if Needed spouse    Quality of Family Relationships helpful;involved;supportive    Able to Return to Prior Arrangements yes       Transition Planning    Patient/Family Anticipates Transition to home with family    Patient/Family Anticipated Services at Transition rehabilitation services    Transportation Anticipated family or friend will provide       Discharge Needs Assessment    Readmission Within the Last 30 Days no previous admission in last 30 days    Concerns to be Addressed denies needs/concerns at this time;substance/tobacco abuse/use    Concerns Comments Pt. plans to follow-up with his PCP for assistance with treatment for alcohol abuse.    Equipment Currently Used at Home none    Equipment Needed After Discharge none    Outpatient/Agency/Support Group Needs outpatient substance abuse treatment    Discharge Facility/Level of Care Needs substance abuse facility    Offered/Gave Vendor List yes    Patient's Choice of Community Agency(s) [facility] in [place], [place], outpatient AA meetings    Current Discharge Risk substance use/abuse    Discharge Coordination/Progress Pt. lives with spouse, children, and grandchild in [facility].  Pt.'s wife will provide transportation at discharge.  Pt. does not use DME and did not have HH services prior to admission.  Pt. is interested in services for alcohol abuse, but stated he plans to work out planning with his PCP after  "he is discharged from [facility].  Pt. hopes to go to [facility] for services and participate in AA meetings.            Discharge Plan     Row Name 07/03/18 1234       Plan    Plan home with family    Patient/Family in Agreement with Plan yes    Plan Comments [facility] met with pt. at bedside.  His spouse was present with permission.  Pt. stated his pain was somewhat better, but his wife reported by was \"seeing things.\"  Pt. reportedly saw an airplane when his wife says there was no airplane.  Pt. stated he participated in treatment at [facility] in 2007 and was able to remain sober for approximately 10 years before relapsing.  Pt. wishes to return to [facility] with the assistance of his PCP.  Pt. was accepting of cessation resources offered to him by [facility].  [facility] gave report to CM and will remain available to assist as needed.    Final Discharge Disposition Code 01 - home or self-care        Destination     No service coordination in this encounter.      Durable Medical Equipment     No service coordination in this encounter.      Dialysis/Infusion     No service coordination in this encounter.      Home Medical Care     No service coordination in this encounter.      Social Care     No service coordination in this encounter.        Expected Discharge Date and Time     Expected Discharge Date Expected Discharge Time    Jul 6, 2018               Demographic Summary     Row Name 07/03/18 1226       General Information    Admission Type inpatient    Arrived From home    Referral Source physician    Reason for Consult substance use concerns;other (see comments)   \"alcohol recovery\"    Preferred Language English     Used During This Interaction no    General Information Comments Pt. stated he is followed by Dr. Juventino Patel in [place] for primary care.              Functional Status     Row Name 07/03/18 1228       Functional Status, IADL    IADL Comments Pt. stated he was independent with ADLs prior to admission.       " Employment/    Employment Status self-employed    Current or Previous Occupation construction    Employment/ Comments Pt. has insurance and prescription coverage through [employer].  Pt. is self-employed but reported he has been unable to work recently due to feeling poorly.            Psychosocial    No documentation.           Abuse/Neglect    No documentation.           Legal    No documentation.           Substance Abuse    No documentation.           Patient Forms    No documentation.         NASIM Vargas

## 2018-07-03 NOTE — H&P
[facility] Medicine Services  HISTORY AND PHYSICAL    Patient Name: Sd Miller  : 1965  MRN: [id number]  Primary Care Physician: No Known Provider    Subjective   Subjective     Chief Complaint: Abdominal Pain    HPI:  Sd Miller is a 52 y.o. male with past medical history of hypertension, opioid use, tobacco use, and alcohol use presents [facility] emergency department after being referred by his PCP for worsening evaluation of abdominal pain, distention, and fluid retention.  Patient reports that 3 months ago he started having severe episodes of nausea and vomiting, which she attributed to his alcohol use.  Patient had been sober for 10 years but relapsed 2 years ago.  Patient reports that his last drink was 2 days ago.  Patient was seen by his PCP and ultimately diagnosed with a viral infection at that time.  Patient and noticed increased swelling in his testicles and went to the emergency department in [place], [place] where he was treated with oral antibiotics and referred to a urologist.  The urologist felt that the swelling of his testicles were consultations from his hernia, and CT abdomen pelvis was performed.  Patient was scheduled for a full body CT later this week.  Despite taking his buttocks as prescribed, patient reports the swelling in his testicles has gotten worse and now has spread to his legs and abdomen.  Patient was started on Lasix by his PCP, which has decreased the swelling in his ankles, however the swelling in his abdomen has remained unchanged.  CT of the abdomen and pelvis in the emergency department shows diffuse fatty infiltration of the liver and ascites. He was also found to have elevated Alkaline Phosphatase, AST, and bilirubin. Patient will be admitted to the hospital medicine service for further evaluation and treatment.     Review of Systems   Constitutional: Positive for unexpected weight change. Negative for chills,  diaphoresis, fatigue and fever.   Respiratory: Negative for cough, shortness of breath and wheezing.    Cardiovascular: Positive for leg swelling. Negative for chest pain and palpitations.        Improved with lasix   Gastrointestinal: Positive for abdominal distention, abdominal pain, constipation, nausea and vomiting.   Genitourinary: Positive for difficulty urinating and scrotal swelling. Negative for frequency, hematuria and urgency.   Musculoskeletal: Negative for arthralgias and myalgias.   Skin: Negative for color change, pallor, rash and wound.   Neurological: Negative for dizziness, syncope, weakness and light-headedness.   Psychiatric/Behavioral: Negative for agitation and confusion.   All other systems reviewed and are negative.    Otherwise 10-system ROS reviewed and is negative except as mentioned in the HPI.    Personal History     Past Medical History:   Diagnosis Date   • Hypertension        History reviewed. No pertinent surgical history.    Family History: family history includes Diabetes in his father and mother; Hyperlipidemia in his mother; Hypertension in his mother; No Known Problems in his brother, brother, daughter, daughter, and daughter.     Social History:  reports that he has been smoking Cigarettes.  He has a 30.00 pack-year smoking history. He has never used smokeless tobacco. He reports that he drinks alcohol. He reports that he uses drugs.  Social History     Social History Narrative   • No narrative on file       Medications:  Prescriptions Prior to Admission   Medication Sig Dispense Refill Last Dose   • amLODIPine (NORVASC) 10 MG tablet Take 10 mg by mouth Daily.      • buprenorphine-naloxone (SUBOXONE) 8-2 MG per SL tablet Place 1 tablet under the tongue Daily.      • furosemide (LASIX) 20 MG tablet Take 20 mg by mouth 2 (Two) Times a Day.      • hydrochlorothiazide (HYDRODIURIL) 12.5 MG tablet Take 12.5 mg by mouth Daily.      • oxyCODONE-acetaminophen (PERCOCET)  MG per  tablet Take 1 tablet by mouth Every 6 (Six) Hours As Needed for Moderate Pain .          Allergies   Allergen Reactions   • Lipitor [Atorvastatin] Unknown (See Comments)     KIDNEY SHUTS DOWN        Objective   Objective     Vital Signs:   Temp:  [98.1 °F (36.7 °C)] 98.1 °F (36.7 °C)  Heart Rate:  [108-135] 108  Resp:  [22] 22  BP: (120-153)/() 127/88        Physical Exam   Constitutional: He is oriented to person, place, and time. He appears well-developed and well-nourished.   HENT:   Head: Normocephalic and atraumatic.   Eyes: EOM are normal. Pupils are equal, round, and reactive to light. Scleral icterus is present.   Neck: Normal range of motion. Neck supple. No JVD present.   Cardiovascular: Normal rate, regular rhythm, normal heart sounds and intact distal pulses.  Exam reveals no gallop and no friction rub.    No murmur heard.  Pulmonary/Chest: Effort normal. No respiratory distress. He has wheezes. He has no rales. He exhibits no tenderness.   Abdominal: Bowel sounds are normal. He exhibits distension and ascites. He exhibits no mass. There is generalized tenderness. There is no rebound and no guarding.   Genitourinary: Right testis shows swelling. Left testis shows swelling.   Musculoskeletal: Normal range of motion. He exhibits edema. He exhibits no tenderness or deformity.   +1 BLE   Neurological: He is alert and oriented to person, place, and time.   Skin: Skin is warm and dry. Capillary refill takes less than 2 seconds. No rash noted. No erythema. No pallor.   Psychiatric: He has a normal mood and affect. His behavior is normal. Judgment and thought content normal.   Nursing note and vitals reviewed.    Results Reviewed:  I have personally reviewed current lab, radiology, and data and agree.      Results from last 7 days  Lab Units 07/02/18  1718 07/02/18  1230   WBC 10*3/mm3  --  14.39*   HEMOGLOBIN g/dL  --  14.2   HEMATOCRIT %  --  40.8   PLATELETS 10*3/mm3  --  160   INR  1.31*  --         Results from last 7 days  Lab Units 18  1230   SODIUM mmol/L 137   POTASSIUM mmol/L 3.0*   CHLORIDE mmol/L 89*   CO2 mmol/L 38.0*   BUN mg/dL 7*   CREATININE mg/dL 0.68   GLUCOSE mg/dL 120*   CALCIUM mg/dL 8.7   ALT (SGPT) U/L 30   AST (SGOT) U/L 249*     Estimated Creatinine Clearance: 132.5 mL/min (by C-G formula based on SCr of 0.68 mg/dL).  Brief Urine Lab Results  (Last result in the past 365 days)      Color   Clarity   Blood   Leuk Est   Nitrite   Protein   CREAT   Urine HCG        18 1718 Orange(A) Clear Negative Small (1+)(A) Positive(A) 30 mg/dL (1+)(A)             No results found for: BNP  Imaging Results (last 24 hours)     Procedure Component Value Units Date/Time    CT Abdomen Pelvis With & Without Contrast [[id number]] Updated:  18 1805        Results for orders placed during the hospital encounter of 09/08/15   Echo - Converted    [place] [id number]     [phone]         ECHO     [id number]         Signed        PATIENT NAME:  HOUSTON GUIDO MRN:  [id number]    :  1965 ACCT#:  [id number]    ATTENDING:  BHARTI LOPEZ MD ADMIT DATE:  09/08/15    PRIMARY CARE:  CHASITY RODRIGUEZ MD LOCATION:          CC:  MITCH HASKINS MD, MD; BHARTI LOPEZ MD  ; CHASITY RODRIGUEZ MD         DATE:       2015          ECHOCARDIOGRAM          INDICATIONS:  MI.           CARDIAC DIMENSIONS:      Left atrium is 4.5 cm     Aortic root of 3 cm.     Septal wall thickness is 0.9.     Posterior wall thickness 1.1.      Left ventricular end diastolic 5.6.      Left ventricular end systolic is 3.9.     Fractional shortening is 35%.     Ejection fraction of 65%.          COMMENTS:  Left ventricular wall thickness appears to be normal.  Left     ventricular dimensions are normal. Left ventricular function is normal. No     obvious wall motion abnormality seen.          Right ventricle appears to be of normal size.  RV function  is normal.          ATRIA:  The left atrium is ketuxq-rl-thgbifofum enlarged. Right atrium is of     normal size.  Interatrial septum is intact.  No flow across the same.           MITRAL VALVE:  Thickened mitral valve leaflets.  Trace mitral insufficiency.      Mitral  in-flow pattern is appropriate. The E:E prime ratio is 7 with a     calculated AP of 11.          AORTIC VALVE: Trileaflet, structurally normal. No Doppler flow is appreciated.           TRICUSPID VALVE:  Structurally normal.  Mild tricuspid insufficiency.      Pulmonary pressures are about 45 mmHg.          PULMONIC VALVE: Not well visualized.            AORTIC ROOT:  Is within normal limits. There is no pericardial effusion. There     is no pericardial thickening.  There is no intracardiac mass, thrombus, or     vegetation.  The IVC appears to be dilated at 2.5 cm.      CONCLUSIONS:      1.  Technically adequate study.     2.  Normal LV systolic function. (EF 65%).     3.  Mild-to-moderate left atrial enlargement with normal left ventricular end     diastolic pressures.     4.  Mild mitral insufficiency with thickened mitral valve leaflets.      5.  Mild tricuspid insufficiency with PA systolic of 45 mmHg.                          MARCELO Nails/hollis     Job ID:   [id number]     Document ID: [id number]     Revised:  0                                      DICTATED BY:      MITCH HASKINS MD    DICTATED DATE/TIME:      09/08/15 1022    TRANSCRIBED DATE/TIME:      09/08/15 1247        SIGNED:         MITCH HASKINS MD          SIGNED DATE/TIME:      09/10/15 1347        COSIGNED:             COSIGNED DATE   TIME:               Assessment/Plan   Assessment / Plan     Hospital Problem List     * (Principal)Cirrhosis (CMS/HCC)    Leukocytosis    Opioid dependence (CMS/HCC)    Alcohol use    Hypertension    Tobacco use        Assessment & Plan:  - Admit to telemetry  - VS q4h  - Consult to GI   - New Cirrhosis   - NPO after midnight  - Paracentesis in    - [facility] protocol  - Scrotal sling  - Continue home medications as appropriate   - Consult to Case Management   - Patient would like information on rehab for his alcohol use   - Tobacco Cessation Education  - Nicotine replacement    Tobacco Cessation Counselin minutes of tobacco cessation counseling provided, including but not limited to, risks of ongoing tobacco use, pertinence to current or future health status and availability/examples of cessation resources.  Patient does not express a desire to quit.    DVT prophylaxis: TEDs/SCDs/HOLD PHARMACOLOGIC    CODE STATUS:    Code Status and Medical Interventions:   Ordered at: 18     Level Of Support Discussed With:    Patient     Code Status:    CPR     Medical Interventions (Level of Support Prior to Arrest):    Full       Admission Status:  I believe this patient meets INPATIENT status due to the need for care which can only be reasonably provided in an hospital setting such as aggressive/expedited ancillary services and/or consultation services, the necessity for IV medications, close physician monitoring and/or the possible need for procedures.  In such, I feel patient’s risk for adverse outcomes and need for care warrant INPATIENT evaluation and predict the patient’s care encounter to likely last beyond 2 midnights.    Electronically signed by TJ Zimmer, 18, 8:13 PM.      Brief Attending Admission Attestation     I have seen and examined the patient, performing an independent face-to-face diagnostic evaluation with plan of care reviewed and developed with Ms. Siu.       Brief Summary Statement/HPI:   Sd Miller is a 52 y.o. male with PMH of EtOH abuse, opioid dependence currently on Suboxone maintenance, and HTN who was sent here by his PCP for evaluation for cirrhosis. Pt reports that he has had worsening edema for the last few months- initially started in his scrotum and was sent to Urology who informed him he had  a hernia. Pt then noted LE edema that worsened over the subsequent few months. He was put on Lasix and his LE and scrotal edema both improved.  Since that time, however, he has had increasing abdominal distention which seems resistant to diuretic therapy.  He denies any F/C.       Attending Physical Exam:  Constitutional: No acute distress, awake, alert  Eyes: PERRLA, sclerae anicteric, no conjunctival injection  HENT: NCAT, mucous membranes moist  Neck: Supple, no thyromegaly, no lymphadenopathy, trachea midline  Respiratory: Clear to auscultation bilaterally, nonlabored respirations   Cardiovascular: RRR, no murmurs, rubs, or gallops, palpable pedal pulses bilaterally  Gastrointestinal: Positive bowel sounds, soft, nontender, + ascites  Musculoskeletal: 1+ pitting edema BLEs to mid shins,, no clubbing or cyanosis to extremities  Psychiatric: Appropriate affect, cooperative  Neurologic: Oriented x 3, strength symmetric in all extremities, Cranial Nerves grossly intact to confrontation, speech clear  Skin: No rashes      Brief Assessment/Plan :  Mr. Miller is a 51 yo WM w/ PMH of HTN, EtOH abuse and opioid dependence who presents with ascites and concern for EtOH cirrhosis.    Plan:  --NPO after MN for LVP in am. Will send for gram stain/culture to r/o SBP as pt also has leukocytosis with no other clear source  --continue suboxone, awaiting [facility] report but pt reports compliance with his Suboxone treatment clinic (even reports that he was prescribed Percocet in mid June and that he and his Pharmacy called his Suboxone clinic so that his medication could be adjusted/stopped for a few days).   --defer abx for time being as not febrile and no abd tenderness so doubt SBP at this time.    See above for further detailed assessment and plan developed with APC which I have reviewed and/or edited.      Electronically signed by Vonda Best MD, 07/03/18, 12:26 AM.

## 2018-07-04 LAB
ALBUMIN FLD-MCNC: 1.4 G/DL
ALBUMIN SERPL-MCNC: 2.33 G/DL (ref 3.2–4.8)
ALP SERPL-CCNC: 237 U/L (ref 25–100)
ALT SERPL W P-5'-P-CCNC: 18 U/L (ref 7–40)
ANION GAP SERPL CALCULATED.3IONS-SCNC: 3 MMOL/L (ref 3–11)
AST SERPL-CCNC: 165 U/L (ref 0–33)
BILIRUB CONJ SERPL-MCNC: 2.2 MG/DL (ref 0–0.2)
BILIRUB INDIRECT SERPL-MCNC: 0.8 MG/DL (ref 0.1–1.1)
BILIRUB SERPL-MCNC: 3 MG/DL (ref 0.3–1.2)
BUN BLD-MCNC: 12 MG/DL (ref 9–23)
BUN/CREAT SERPL: 19 (ref 7–25)
CALCIUM SPEC-SCNC: 8 MG/DL (ref 8.7–10.4)
CHLORIDE SERPL-SCNC: 96 MMOL/L (ref 99–109)
CO2 SERPL-SCNC: 34 MMOL/L (ref 20–31)
CREAT BLD-MCNC: 0.63 MG/DL (ref 0.6–1.3)
DEPRECATED RDW RBC AUTO: 61.6 FL (ref 37–54)
ERYTHROCYTE [DISTWIDTH] IN BLOOD BY AUTOMATED COUNT: 15.7 % (ref 11.3–14.5)
GFR SERPL CREATININE-BSD FRML MDRD: 135 ML/MIN/1.73
GLUCOSE BLD-MCNC: 100 MG/DL (ref 70–100)
HAV AB SER QL IA: NEGATIVE
HCT VFR BLD AUTO: 35.2 % (ref 38.9–50.9)
HGB BLD-MCNC: 11.8 G/DL (ref 13.1–17.5)
MAGNESIUM SERPL-MCNC: 2.1 MG/DL (ref 1.3–2.7)
MCH RBC QN AUTO: 35.9 PG (ref 27–31)
MCHC RBC AUTO-ENTMCNC: 33.5 G/DL (ref 32–36)
MCV RBC AUTO: 107 FL (ref 80–99)
PHOSPHATE SERPL-MCNC: 2.4 MG/DL (ref 2.4–5.1)
PLATELET # BLD AUTO: 121 10*3/MM3 (ref 150–450)
PMV BLD AUTO: 12.1 FL (ref 6–12)
POTASSIUM BLD-SCNC: 3.2 MMOL/L (ref 3.5–5.5)
POTASSIUM BLD-SCNC: 3.2 MMOL/L (ref 3.5–5.5)
PROT SERPL-MCNC: 6 G/DL (ref 5.7–8.2)
RBC # BLD AUTO: 3.29 10*6/MM3 (ref 4.2–5.76)
SODIUM BLD-SCNC: 133 MMOL/L (ref 132–146)
WBC NRBC COR # BLD: 8.66 10*3/MM3 (ref 3.5–10.8)

## 2018-07-04 PROCEDURE — 99233 SBSQ HOSP IP/OBS HIGH 50: CPT | Performed by: HOSPITALIST

## 2018-07-04 PROCEDURE — 83735 ASSAY OF MAGNESIUM: CPT | Performed by: HOSPITALIST

## 2018-07-04 PROCEDURE — 84100 ASSAY OF PHOSPHORUS: CPT | Performed by: HOSPITALIST

## 2018-07-04 PROCEDURE — 25010000002 ENOXAPARIN PER 10 MG: Performed by: HOSPITALIST

## 2018-07-04 PROCEDURE — 80048 BASIC METABOLIC PNL TOTAL CA: CPT | Performed by: HOSPITALIST

## 2018-07-04 PROCEDURE — 99253 IP/OBS CNSLTJ NEW/EST LOW 45: CPT | Performed by: INTERNAL MEDICINE

## 2018-07-04 PROCEDURE — 84132 ASSAY OF SERUM POTASSIUM: CPT | Performed by: INTERNAL MEDICINE

## 2018-07-04 PROCEDURE — 85027 COMPLETE CBC AUTOMATED: CPT | Performed by: HOSPITALIST

## 2018-07-04 PROCEDURE — 25010000002 THIAMINE PER 100 MG: Performed by: HOSPITALIST

## 2018-07-04 PROCEDURE — 80076 HEPATIC FUNCTION PANEL: CPT | Performed by: HOSPITALIST

## 2018-07-04 RX ORDER — POTASSIUM CHLORIDE 750 MG/1
40 CAPSULE, EXTENDED RELEASE ORAL EVERY 4 HOURS
Status: DISPENSED | OUTPATIENT
Start: 2018-07-04 | End: 2018-07-04

## 2018-07-04 RX ORDER — DIAZEPAM 5 MG/1
5 TABLET ORAL EVERY 12 HOURS
Status: DISCONTINUED | OUTPATIENT
Start: 2018-07-04 | End: 2018-07-06

## 2018-07-04 RX ADMIN — THIAMINE HYDROCHLORIDE 500 MG: 100 INJECTION, SOLUTION INTRAMUSCULAR; INTRAVENOUS at 01:00

## 2018-07-04 RX ADMIN — FOLIC ACID 1 MG: 1 TABLET ORAL at 09:01

## 2018-07-04 RX ADMIN — POTASSIUM CHLORIDE 40 MEQ: 750 CAPSULE, EXTENDED RELEASE ORAL at 06:43

## 2018-07-04 RX ADMIN — THIAMINE HYDROCHLORIDE 500 MG: 100 INJECTION, SOLUTION INTRAMUSCULAR; INTRAVENOUS at 17:12

## 2018-07-04 RX ADMIN — THIAMINE HYDROCHLORIDE 500 MG: 100 INJECTION, SOLUTION INTRAMUSCULAR; INTRAVENOUS at 09:55

## 2018-07-04 RX ADMIN — DIAZEPAM 5 MG: 5 TABLET ORAL at 18:43

## 2018-07-04 RX ADMIN — LORAZEPAM 2 MG: 1 TABLET ORAL at 17:13

## 2018-07-04 RX ADMIN — NICOTINE 1 PATCH: 21 PATCH, EXTENDED RELEASE TRANSDERMAL at 09:00

## 2018-07-04 RX ADMIN — DIAZEPAM 5 MG: 5 TABLET ORAL at 05:51

## 2018-07-04 RX ADMIN — POTASSIUM CHLORIDE 40 MEQ: 750 CAPSULE, EXTENDED RELEASE ORAL at 12:22

## 2018-07-04 RX ADMIN — LORAZEPAM 2 MG: 1 TABLET ORAL at 12:22

## 2018-07-04 RX ADMIN — Medication 1 TABLET: at 09:01

## 2018-07-04 RX ADMIN — POTASSIUM CHLORIDE 40 MEQ: 750 CAPSULE, EXTENDED RELEASE ORAL at 15:58

## 2018-07-04 RX ADMIN — BUPRENORPHINE AND NALOXONE 1 TABLET: 8; 2 TABLET SUBLINGUAL at 09:01

## 2018-07-04 RX ADMIN — ENOXAPARIN SODIUM 40 MG: 40 INJECTION SUBCUTANEOUS at 09:01

## 2018-07-04 NOTE — PROGRESS NOTES
Clinical Nutrition   Reason For Visit: Follow-up protocol    Patient Name: Sd Miller  YOB: 1965  MRN: [id number]  Date of Encounter: 07/04/18 4:05 PM  Admission date: 7/2/2018      RD provided pt and significant other with education regarding nutrition for cirrhosis (new dx this admission).  Also ordered daily HS snack.      Nutrition Assessment     Hospital Problem List  Principal Problem:    Alcoholic hepatitis  Active Problems:    Hypertension    Alcohol use    Tobacco use    Leukocytosis    Opioid dependence (CMS/HCC)        PMH: He  has a past medical history of Hypertension.   PSxH: He  has no past surgical history on file.       Other Applicable Diagnosis:  Alcoholic cirrhosis (new dx)  Right inguinal hernia  Mild ascites     Applicable medical tests/procedures since admission:  S/p paracentesis (7/3) - 2.8 L removed  GI consult pending       Reported/Observed/Food/Nutrition Related History   Patient and SO present. SO reports patient eating very well. RD advised pt he needs HS snack - pt requests 1/2 chicken salad sandwich and Boost Plus supplement.        Anthropometrics   Height: 68 in  Weight: 157 lbs (standing wt 7/2 per nsg documentation) - RD notes this is likely overestimating dry weight 2/2 mild ascites on admission  BMI: 24.0  BMI classification: Normal: 18.5-24.9kg/m2   UBW: 155 lbs (per pt)     Weight change: RD unable to determine accurate weight loss at this time; ordered standing wt now that patient post-paracentesis      Needs Assessment   Height used: 68 in  Weight used: 155 lbs     Estimated Protein needs: 90 g pro  1.0-1.2 g/kg =  g        Labs reviewed   Labs reviewed: Yes      Medications reviewed   Medications reviewed: Yes  Pertinent: MIV, B1, folic acid      Current Nutrition Prescription   PO: Diet Regular; Low Sodium, Daily Fluid Restriction; 1500 mL Fluid; Other; 120; 2,000 mg Na      Nutrition Diagnosis     Problem Increased nutrient needs (protein)    Etiology Condition associated with increased protein needs   Signs/Symptoms Cirrhosis (new dx this admission)       Problem Food and nutrition knowledge deficit   Etiology No prior need for edu   Signs/Symptoms New dx cirrhosis       Intervention   Intervention: Follow treatment progress, Care plan reviewed, Interview for preferences, Encourage intake, Education provided     Ordered daily HS snack (1/2 chicken salad sandwich + Boost Plus supplement)    Education: RD provided pt and SO both verbal education and written materials regarding nutrition recommendations for cirrhosis. Obtained materials from [employer] Nutrition Care Manual. Patient and SO asked appropriate questions and verbalized understanding. RD encouraged pt to ask questions as they arise during this admission.      Goal:   General: Nutrition support treatment  PO: Establish PO      Monitoring/Evaluation:       Monitoring/Evaluation: Per protocol, PO intake, Supplement intake, Weight  Will Continue to follow per protocol  Maricel Charles RD  Time Spent: 45 min

## 2018-07-04 NOTE — PLAN OF CARE
Problem: Alcohol Withdrawal Acute, Risk/Actual (Adult)  Goal: Signs and Symptoms of Listed Potential Problems Will be Absent, Minimized or Managed (Alcohol Withdrawal Acute, Risk/Actual)  Outcome: Ongoing (interventions implemented as appropriate)   07/03/18 4940   Goal/Outcome Evaluation   Problems Assessed (Alcohol Withdrawal Syndrome) all   Problems Present (Alcohol W/D Syndrome) effects of YON (alcohol withdrawal syndrome)

## 2018-07-04 NOTE — PROGRESS NOTES
[facility] Medicine Services  PROGRESS NOTE    Patient Name: Sd Miller  : 1965  MRN: [id number]    Date of Admission: 2018  Length of Stay: 2  Primary Care Physician: No Known Provider    Subjective   Subjective     CC:  FU abdominal pain    HPI:  Wife at bedside. Tolerated the paracentesis well. Withdrawal symptoms improving    Review of Systems  Gen- No fevers, chills  CV- No chest pain, palpitations  Resp- No cough, dyspnea  GI- No N/V/D, abd pain    Otherwise ROS is negative except as mentioned in the HPI.    Objective   Objective     Vital Signs:   Temp:  [98.2 °F (36.8 °C)-99.4 °F (37.4 °C)] 99.4 °F (37.4 °C)  Heart Rate:  [] 103  Resp:  [16-18] 18  BP: (106-127)/(74-87) 126/85        Physical Exam:  Constitutional: No acute distress, awake, alert on RA  Eyes: PERRLA, sclerae anicteric, no conjunctival injection  HENT: NCAT, mucous membranes moist  Neck: Supple, no thyromegaly, no lymphadenopathy, trachea midline  Respiratory: Clear to auscultation bilaterally, nonlabored respirations   Cardiovascular: RRR, no murmurs, rubs, or gallops, palpable pedal pulses bilaterally  Gastrointestinal: Positive bowel sounds, soft, mild TTP in R inguinal region, nondistended  Musculoskeletal: No bilateral ankle edema, no clubbing or cyanosis to extremities  Psychiatric: Appropriate affect, cooperative  Neurologic: Oriented x 3, strength symmetric in all extremities, Cranial Nerves grossly intact to confrontation, speech clear  Skin: No rashes    Results Reviewed:  I have personally reviewed current lab, radiology, and data and agree.      Results from last 7 days  Lab Units 18  0445 18  0417 18  1718 18  1230   WBC 10*3/mm3 8.66 8.35  --  14.39*   HEMOGLOBIN g/dL 11.8* 11.6*  --  14.2   HEMATOCRIT % 35.2* 34.1*  --  40.8   PLATELETS 10*3/mm3 121* 138*  --  160   INR   --   --  1.31*  --        Results from last 7 days  Lab Units 18  1794  07/03/18  1043 07/03/18  0417 07/02/18  1230   SODIUM mmol/L 133  --  135 137   POTASSIUM mmol/L 3.2*  3.2* 3.7 2.7* 3.0*   CHLORIDE mmol/L 96*  --  91* 89*   CO2 mmol/L 34.0*  --  37.0* 38.0*   BUN mg/dL 12  --  9 7*   CREATININE mg/dL 0.63  --  0.63 0.68   GLUCOSE mg/dL 100  --  86 120*   CALCIUM mg/dL 8.0*  --  7.9* 8.7   ALT (SGPT) U/L 18  --  24 30   AST (SGOT) U/L 165*  --  172* 249*     No results found for: BNP  No results found for: PHART    Microbiology Results Abnormal     Procedure Component Value - Date/Time    Body Fluid Culture - Body Fluid, Peritoneum [[id number]]  (Normal) Collected:  07/03/18 1512    Lab Status:  Preliminary result Specimen:  Body Fluid from Peritoneum Updated:  07/04/18 0619     BF Culture No growth at 24 hours     Gram Stain Result Few (2+) WBCs seen      No organisms seen          Imaging Results (last 24 hours)     Procedure Component Value Units Date/Time    CT Guided Paracentesis [[id number]] Collected:  07/03/18 1634     Updated:  07/03/18 1642    Narrative:       EXAMINATION: CT-guided paracentesis     INDICATION: Ascites; K70.31-Alcoholic cirrhosis of liver with ascites;  R10.84-Generalized abdominal pain; R79.89-Other specified abnormal  findings of blood chemistry; R00.0-Tachycardia, unspecified     TECHNIQUE: 5 mm low-dose CT images through the pelvis for imaging  guidance.     The radiation dose reduction device was turned on for each scan per the  ALARA (As Low as Reasonably Achievable) protocol.     COMPARISON: NONE     FINDINGS: Informed written consent was obtained from the patient prior  to beginning. The procedure was discussed in detail including potential  risks of bleeding and infection as well as how bleeding complications  are managed. The patient indicated understanding and agreed  to proceed.     With the patient in the supine position on the CT scan table, the skin  overlying the pelvis was marked, prepped and draped in sterile fashion  and 1% lidocaine  infiltrated into the skin and subcutaneous tissues as  local anesthesia using a 1-1/2 inch 25-gauge needle    . Then using a  14-gauge centesis catheter, abdominopelvic fluid collection was accessed  under direct fluoroscopic guidance with straw-colored fluid return. A  sample was taken and sent to the lab. Then the catheter was connected to  wall suction and 2.8 L more fluid were drained. Follow-up scan shows no  evidence of hemorrhage. The catheter was then removed. The patient  tolerated the procedure very well and there were no immediate  complications.       Impression:          CT-guided paracentesis.        This report was finalized on 7/3/2018 4:40 PM by Lizandro Loo.           Results for orders placed during the hospital encounter of 09/08/15   Echo - Converted    Narrative   [address] [address]     [phone]         ECHO     [id number]         Signed        PATIENT NAME:  HOUSTON GUIDO MRN:  [id number]    :  1965 ACCT#:  [id number]    ATTENDING:  BHARTI LOPEZ MD ADMIT DATE:  09/08/15    PRIMARY CARE:  CHASITY RODRIGUEZ MD LOCATION:          CC:  MITCH HASKINS MD, MD; BHARTI LOPEZ MD  ; CHASITY RODRIGUEZ MD         DATE:       2015          ECHOCARDIOGRAM          INDICATIONS:  MI.           CARDIAC DIMENSIONS:      Left atrium is 4.5 cm     Aortic root of 3 cm.     Septal wall thickness is 0.9.     Posterior wall thickness 1.1.      Left ventricular end diastolic 5.6.      Left ventricular end systolic is 3.9.     Fractional shortening is 35%.     Ejection fraction of 65%.          COMMENTS:  Left ventricular wall thickness appears to be normal.  Left     ventricular dimensions are normal. Left ventricular function is normal. No     obvious wall motion abnormality seen.          Right ventricle appears to be of normal size.  RV function is normal.          ATRIA:  The left atrium is beicsy-hl-ounryzxwzm enlarged. Right atrium is of      normal size.  Interatrial septum is intact.  No flow across the same.           MITRAL VALVE:  Thickened mitral valve leaflets.  Trace mitral insufficiency.      Mitral  in-flow pattern is appropriate. The E:E prime ratio is 7 with a     calculated AP of 11.          AORTIC VALVE: Trileaflet, structurally normal. No Doppler flow is appreciated.           TRICUSPID VALVE:  Structurally normal.  Mild tricuspid insufficiency.      Pulmonary pressures are about 45 mmHg.          PULMONIC VALVE: Not well visualized.            AORTIC ROOT:  Is within normal limits. There is no pericardial effusion. There     is no pericardial thickening.  There is no intracardiac mass, thrombus, or     vegetation.  The IVC appears to be dilated at 2.5 cm.      CONCLUSIONS:      1.  Technically adequate study.     2.  Normal LV systolic function. (EF 65%).     3.  Mild-to-moderate left atrial enlargement with normal left ventricular end     diastolic pressures.     4.  Mild mitral insufficiency with thickened mitral valve leaflets.      5.  Mild tricuspid insufficiency with PA systolic of 45 mmHg.                          MARCELO Nails/hollis     Job ID:   [id number]     Document ID: [id number]     Revised:  0                                      DICTATED BY:      MITCH HASKINS MD    DICTATED DATE/TIME:      09/08/15 1022    TRANSCRIBED DATE/TIME:      09/08/15 1247        SIGNED:         MITCH HASKINS MD          SIGNED DATE/TIME:      09/10/15 1347        COSIGNED:             COSIGNED DATE   TIME:               I have reviewed the medications.    Assessment/Plan   Assessment / Plan     Hospital Problem List     * (Principal)Alcoholic hepatitis    Hypertension    Alcohol use    Tobacco use    Leukocytosis    Opioid dependence (CMS/HCC)           Brief Hospital Course to date:  Sd Miller is a 52 y.o. male with past medical history of hypertension, opioid use, tobacco use, and alcohol use presents Psych[facility]  emergency department after being referred by his PCP for worsening evaluation of abdominal pain, distention, and fluid retention.    Assessment & Plan:    - Alcoholic hepatitis: Discriminant Function <32. Good prognosis. s/p para with 2.8L removed 7/3. Neg for SBP. LFTs improving. Ascitic fluids SAAG consistent with malignancy/pancreatic/infectious etiology  - EtOH use: Decrease Valium 5mg q12hrs + CIWA. High-dose thiamine. Folic acid. CIWA scores reviewed  - Chronic pain: UDS +Suboxone. WILBUR reviewed. Continue Suboxone at half home dose  - Hypokalemia: Replete    DVT Prophylaxis:  pLOV    CODE STATUS:   Code Status and Medical Interventions:   Ordered at: 07/02/18 2110     Level Of Support Discussed With:    Patient     Code Status:    CPR     Medical Interventions (Level of Support Prior to Arrest):    Full       Disposition: I expect the patient to be discharged home NAHOMY Conway MD  07/04/18  4:42 PM

## 2018-07-04 NOTE — PLAN OF CARE
Problem: Alcohol Withdrawal Acute, Risk/Actual (Adult)  Intervention: Support/Optimize Psychosocial Response to Withdrawal Process   18   Coping/Psychosocial Interventions   Supportive Measures relaxation techniques promoted   Environmental Support calm environment promoted   Pain/Comfort/Sleep Interventions   Sleep/Rest Enhancement awakenings minimized;regular sleep/rest pattern promoted;relaxation techniques promoted     Intervention: Minimize/Manage Withdrawal Symptoms   18 1400 18 1700   Cognitive Interventions   Sensory Stimulation Regulation care clustered;lighting decreased --  --    Positioning   Head of Bed (HOB) --  --  --    Safety Interventions   Aspiration Precautions --  awake/alert before oral intake --    Seizure Precautions --  --  activity supervised   Safety Management   Environmental Safety Modification --  --  --    Prevent  Drop/Fall   Safety/Security Measures --  --  --     18 1800   Cognitive Interventions   Sensory Stimulation Regulation --    Positioning   Head of Bed (HOB) HOB elevated   Safety Interventions   Aspiration Precautions --    Seizure Precautions --    Safety Management   Environmental Safety Modification assistive device/personal items within reach   Prevent  Drop/Fall   Safety/Security Measures family to remain at bedside     Intervention: Monitor/Manage Fluid Electrolyte Balance   18   Nutrition Interventions   Fluid/Electrolyte Management electrolyte supplement initiated;fluids restricted       Goal: Signs and Symptoms of Listed Potential Problems Will be Absent, Minimized or Managed (Alcohol Withdrawal Acute, Risk/Actual)   18   Goal/Outcome Evaluation   Problems Assessed (Alcohol Withdrawal Syndrome) all   Problems Present (Alcohol W/D Syndrome) effects of YON (alcohol withdrawal syndrome)

## 2018-07-05 LAB
ALBUMIN SERPL-MCNC: 2.34 G/DL (ref 3.2–4.8)
ALP SERPL-CCNC: 240 U/L (ref 25–100)
ALT SERPL W P-5'-P-CCNC: 12 U/L (ref 7–40)
ANION GAP SERPL CALCULATED.3IONS-SCNC: 6 MMOL/L (ref 3–11)
AST SERPL-CCNC: 149 U/L (ref 0–33)
BACTERIA UR QL AUTO: ABNORMAL /HPF
BILIRUB CONJ SERPL-MCNC: 2 MG/DL (ref 0–0.2)
BILIRUB INDIRECT SERPL-MCNC: 0.6 MG/DL (ref 0.1–1.1)
BILIRUB SERPL-MCNC: 2.6 MG/DL (ref 0.3–1.2)
BILIRUB UR QL STRIP: ABNORMAL
BUN BLD-MCNC: 14 MG/DL (ref 9–23)
BUN/CREAT SERPL: 23.7 (ref 7–25)
CALCIUM SPEC-SCNC: 8 MG/DL (ref 8.7–10.4)
CHLORIDE SERPL-SCNC: 102 MMOL/L (ref 99–109)
CLARITY UR: CLEAR
CO2 SERPL-SCNC: 29 MMOL/L (ref 20–31)
COLOR UR: ABNORMAL
CREAT BLD-MCNC: 0.59 MG/DL (ref 0.6–1.3)
GFR SERPL CREATININE-BSD FRML MDRD: 145 ML/MIN/1.73
GLUCOSE BLD-MCNC: 94 MG/DL (ref 70–100)
GLUCOSE UR STRIP-MCNC: NEGATIVE MG/DL
HBV SURFACE AB SER RIA-ACNC: NORMAL
HGB UR QL STRIP.AUTO: NEGATIVE
HYALINE CASTS UR QL AUTO: ABNORMAL /LPF
KETONES UR QL STRIP: ABNORMAL
LAB AP CASE REPORT: NORMAL
LEUKOCYTE ESTERASE UR QL STRIP.AUTO: ABNORMAL
NITRITE UR QL STRIP: POSITIVE
PATH REPORT.FINAL DX SPEC: NORMAL
PH UR STRIP.AUTO: 5.5 [PH] (ref 5–8)
PHOSPHATE SERPL-MCNC: 2.3 MG/DL (ref 2.4–5.1)
POTASSIUM BLD-SCNC: 4.6 MMOL/L (ref 3.5–5.5)
PROT SERPL-MCNC: 6 G/DL (ref 5.7–8.2)
PROT UR QL STRIP: ABNORMAL
RBC # UR: ABNORMAL /HPF
REF LAB TEST METHOD: ABNORMAL
SODIUM BLD-SCNC: 137 MMOL/L (ref 132–146)
SP GR UR STRIP: 1.03 (ref 1–1.03)
SQUAMOUS #/AREA URNS HPF: ABNORMAL /HPF
UROBILINOGEN UR QL STRIP: ABNORMAL
WBC UR QL AUTO: ABNORMAL /HPF

## 2018-07-05 PROCEDURE — 81001 URINALYSIS AUTO W/SCOPE: CPT | Performed by: NURSE PRACTITIONER

## 2018-07-05 PROCEDURE — 86706 HEP B SURFACE ANTIBODY: CPT | Performed by: INTERNAL MEDICINE

## 2018-07-05 PROCEDURE — 25010000002 THIAMINE PER 100 MG: Performed by: HOSPITALIST

## 2018-07-05 PROCEDURE — 25010000002 KETOROLAC TROMETHAMINE PER 15 MG: Performed by: NURSE PRACTITIONER

## 2018-07-05 PROCEDURE — 80048 BASIC METABOLIC PNL TOTAL CA: CPT | Performed by: HOSPITALIST

## 2018-07-05 PROCEDURE — 86708 HEPATITIS A ANTIBODY: CPT | Performed by: INTERNAL MEDICINE

## 2018-07-05 PROCEDURE — 80076 HEPATIC FUNCTION PANEL: CPT | Performed by: HOSPITALIST

## 2018-07-05 PROCEDURE — 25010000002 FUROSEMIDE PER 20 MG: Performed by: NURSE PRACTITIONER

## 2018-07-05 PROCEDURE — 99233 SBSQ HOSP IP/OBS HIGH 50: CPT | Performed by: NURSE PRACTITIONER

## 2018-07-05 PROCEDURE — 99233 SBSQ HOSP IP/OBS HIGH 50: CPT | Performed by: INTERNAL MEDICINE

## 2018-07-05 PROCEDURE — 84100 ASSAY OF PHOSPHORUS: CPT | Performed by: HOSPITALIST

## 2018-07-05 PROCEDURE — 25010000002 ENOXAPARIN PER 10 MG: Performed by: HOSPITALIST

## 2018-07-05 RX ORDER — FUROSEMIDE 10 MG/ML
40 INJECTION INTRAMUSCULAR; INTRAVENOUS ONCE
Status: COMPLETED | OUTPATIENT
Start: 2018-07-05 | End: 2018-07-05

## 2018-07-05 RX ORDER — SPIRONOLACTONE 50 MG/1
50 TABLET, FILM COATED ORAL DAILY
Status: DISCONTINUED | OUTPATIENT
Start: 2018-07-06 | End: 2018-07-08 | Stop reason: HOSPADM

## 2018-07-05 RX ORDER — BUPRENORPHINE HYDROCHLORIDE AND NALOXONE HYDROCHLORIDE DIHYDRATE 8; 2 MG/1; MG/1
1 TABLET SUBLINGUAL ONCE
Status: COMPLETED | OUTPATIENT
Start: 2018-07-05 | End: 2018-07-05

## 2018-07-05 RX ORDER — BUPRENORPHINE HYDROCHLORIDE AND NALOXONE HYDROCHLORIDE DIHYDRATE 8; 2 MG/1; MG/1
1 TABLET SUBLINGUAL 2 TIMES DAILY
Status: DISCONTINUED | OUTPATIENT
Start: 2018-07-06 | End: 2018-07-08 | Stop reason: HOSPADM

## 2018-07-05 RX ORDER — FUROSEMIDE 20 MG/1
20 TABLET ORAL DAILY
Status: DISCONTINUED | OUTPATIENT
Start: 2018-07-06 | End: 2018-07-07

## 2018-07-05 RX ORDER — BUPRENORPHINE HYDROCHLORIDE AND NALOXONE HYDROCHLORIDE DIHYDRATE 8; 2 MG/1; MG/1
2 TABLET SUBLINGUAL DAILY
Status: DISCONTINUED | OUTPATIENT
Start: 2018-07-06 | End: 2018-07-05

## 2018-07-05 RX ORDER — KETOROLAC TROMETHAMINE 30 MG/ML
15 INJECTION, SOLUTION INTRAMUSCULAR; INTRAVENOUS EVERY 6 HOURS PRN
Status: DISCONTINUED | OUTPATIENT
Start: 2018-07-05 | End: 2018-07-07

## 2018-07-05 RX ADMIN — LORAZEPAM 1 MG: 1 TABLET ORAL at 11:29

## 2018-07-05 RX ADMIN — ENOXAPARIN SODIUM 40 MG: 40 INJECTION SUBCUTANEOUS at 08:18

## 2018-07-05 RX ADMIN — LORAZEPAM 1 MG: 1 TABLET ORAL at 22:08

## 2018-07-05 RX ADMIN — Medication 1 TABLET: at 08:17

## 2018-07-05 RX ADMIN — THIAMINE HYDROCHLORIDE 500 MG: 100 INJECTION, SOLUTION INTRAMUSCULAR; INTRAVENOUS at 17:12

## 2018-07-05 RX ADMIN — BUPRENORPHINE AND NALOXONE 1 TABLET: 8; 2 TABLET SUBLINGUAL at 10:34

## 2018-07-05 RX ADMIN — DIAZEPAM 5 MG: 5 TABLET ORAL at 17:16

## 2018-07-05 RX ADMIN — THIAMINE HYDROCHLORIDE 500 MG: 100 INJECTION, SOLUTION INTRAMUSCULAR; INTRAVENOUS at 09:12

## 2018-07-05 RX ADMIN — THIAMINE HYDROCHLORIDE 500 MG: 100 INJECTION, SOLUTION INTRAMUSCULAR; INTRAVENOUS at 02:13

## 2018-07-05 RX ADMIN — NICOTINE 1 PATCH: 21 PATCH, EXTENDED RELEASE TRANSDERMAL at 08:19

## 2018-07-05 RX ADMIN — FUROSEMIDE 40 MG: 10 INJECTION, SOLUTION INTRAMUSCULAR; INTRAVENOUS at 12:23

## 2018-07-05 RX ADMIN — KETOROLAC TROMETHAMINE 15 MG: 30 INJECTION, SOLUTION INTRAMUSCULAR at 17:11

## 2018-07-05 RX ADMIN — BUPRENORPHINE AND NALOXONE 1 TABLET: 8; 2 TABLET SUBLINGUAL at 08:17

## 2018-07-05 RX ADMIN — DIAZEPAM 5 MG: 5 TABLET ORAL at 06:46

## 2018-07-05 RX ADMIN — FOLIC ACID 1 MG: 1 TABLET ORAL at 08:18

## 2018-07-05 RX ADMIN — Medication 5 MG: at 22:08

## 2018-07-05 NOTE — PROGRESS NOTES
[facility] Medicine Services  PROGRESS NOTE    Patient Name: Sd Miller  : 1965  MRN: [id number]    Date of Admission: 2018  Length of Stay: 3  Primary Care Physician: No Known Provider    Subjective   Subjective     CC:  FU abdominal pain    HPI:  C/o pain at hernia site and pain in general.  Denies F/C.      Review of Systems  Gen- No fevers, chills  CV- No chest pain, palpitations  Resp- No cough, dyspnea  GI- No N/V/D, abd pain    Otherwise ROS is negative except as mentioned in the HPI.    Objective   Objective     Vital Signs:   Temp:  [98.5 °F (36.9 °C)-98.7 °F (37.1 °C)] 98.7 °F (37.1 °C)  Heart Rate:  [86] 86  Resp:  [18] 18  BP: (105-111)/(66-70) 110/70        Physical Exam:  Constitutional: No acute distress, awake, alert on RA  Eyes: PERRLA, sclerae anicteric, no conjunctival injection  HENT: NCAT, mucous membranes moist  Neck: Supple, no thyromegaly, no lymphadenopathy, trachea midline  Respiratory: Clear to auscultation bilaterally, nonlabored respirations   Cardiovascular: RRR, no murmurs, rubs, or gallops, palpable pedal pulses bilaterally  Gastrointestinal: Positive bowel sounds, soft, mild TTP in R inguinal region, nondistended  Musculoskeletal: No bilateral ankle edema, no clubbing or cyanosis to extremities  Psychiatric: Appropriate affect, cooperative  Neurologic: Oriented x 3, strength symmetric in all extremities, Cranial Nerves grossly intact to confrontation, speech clear  Skin: No rashes    Results Reviewed:  I have personally reviewed current lab, radiology, and data and agree.      Results from last 7 days  Lab Units 18  0445 18  0417 18  1718 18  1230   WBC 10*3/mm3 8.66 8.35  --  14.39*   HEMOGLOBIN g/dL 11.8* 11.6*  --  14.2   HEMATOCRIT % 35.2* 34.1*  --  40.8   PLATELETS 10*3/mm3 121* 138*  --  160   INR   --   --  1.31*  --        Results from last 7 days  Lab Units 18  0633 18  0444 18  1045  18  0417   SODIUM mmol/L 137 133  --  135   POTASSIUM mmol/L 4.6 3.2*  3.2* 3.7 2.7*   CHLORIDE mmol/L 102 96*  --  91*   CO2 mmol/L 29.0 34.0*  --  37.0*   BUN mg/dL 14 12  --  9   CREATININE mg/dL 0.59* 0.63  --  0.63   GLUCOSE mg/dL 94 100  --  86   CALCIUM mg/dL 8.0* 8.0*  --  7.9*   ALT (SGPT) U/L 12 18  --  24   AST (SGOT) U/L 149* 165*  --  172*     No results found for: BNP  No results found for: PHART    Microbiology Results Abnormal     Procedure Component Value - Date/Time    Body Fluid Culture - Body Fluid, Peritoneum [[id number]]  (Normal) Collected:  18 1512    Lab Status:  Preliminary result Specimen:  Body Fluid from Peritoneum Updated:  18 1104     BF Culture No growth at 2 days     Gram Stain Result Few (2+) WBCs seen      No organisms seen          Imaging Results (last 24 hours)     ** No results found for the last 24 hours. **        Results for orders placed during the hospital encounter of 09/08/15   Echo - [place] [id number]     [phone]         ECHO     [id number]         Signed        PATIENT NAME:  HOUSTON GUIDO MRN:  [id number]    :  1965 ACCT#:  [id number]    ATTENDING:  BHARTI LOPEZ MD ADMIT DATE:  09/08/15    PRIMARY CARE:  CHASITY RODRIGUEZ MD LOCATION:          CC:  MITCH HASKINS MD, MD; BHARTI LOPEZ MD  ; CHASITY RODRIGUEZ MD         DATE:       2015          ECHOCARDIOGRAM          INDICATIONS:  MI.           CARDIAC DIMENSIONS:      Left atrium is 4.5 cm     Aortic root of 3 cm.     Septal wall thickness is 0.9.     Posterior wall thickness 1.1.      Left ventricular end diastolic 5.6.      Left ventricular end systolic is 3.9.     Fractional shortening is 35%.     Ejection fraction of 65%.          COMMENTS:  Left ventricular wall thickness appears to be normal.  Left     ventricular dimensions are normal. Left ventricular function is normal. No     obvious wall  motion abnormality seen.          Right ventricle appears to be of normal size.  RV function is normal.          ATRIA:  The left atrium is yeraqt-jn-cceqgsnbtx enlarged. Right atrium is of     normal size.  Interatrial septum is intact.  No flow across the same.           MITRAL VALVE:  Thickened mitral valve leaflets.  Trace mitral insufficiency.      Mitral  in-flow pattern is appropriate. The E:E prime ratio is 7 with a     calculated AP of 11.          AORTIC VALVE: Trileaflet, structurally normal. No Doppler flow is appreciated.           TRICUSPID VALVE:  Structurally normal.  Mild tricuspid insufficiency.      Pulmonary pressures are about 45 mmHg.          PULMONIC VALVE: Not well visualized.            AORTIC ROOT:  Is within normal limits. There is no pericardial effusion. There     is no pericardial thickening.  There is no intracardiac mass, thrombus, or     vegetation.  The IVC appears to be dilated at 2.5 cm.      CONCLUSIONS:      1.  Technically adequate study.     2.  Normal LV systolic function. (EF 65%).     3.  Mild-to-moderate left atrial enlargement with normal left ventricular end     diastolic pressures.     4.  Mild mitral insufficiency with thickened mitral valve leaflets.      5.  Mild tricuspid insufficiency with PA systolic of 45 mmHg.                          Mitch Haskins M.D.     AK/hollis     Job ID:   [id number]     Document ID: [id number]     Revised:  0                                      DICTATED BY:      MITCH HASKINS MD    DICTATED DATE/TIME:      09/08/15 1022    TRANSCRIBED DATE/TIME:      09/08/15 1247        SIGNED:         MITCH HASKINS MD          SIGNED DATE/TIME:      09/10/15 1347        COSIGNED:             COSIGNED DATE   TIME:               I have reviewed the medications.    Assessment/Plan   Assessment / Plan     Hospital Problem List     * (Principal)Alcoholic hepatitis    Leukocytosis    Opioid dependence (CMS/HCC)    Alcohol use    Hypertension    Tobacco use            Brief Hospital Course to date:  Sd Miller is a 52 y.o. male with past medical history of hypertension, opioid use, tobacco use, and alcohol use presents [facility] emergency department after being referred by his PCP for worsening evaluation of abdominal pain, distention, and fluid retention.    Assessment & Plan:    - Alcoholic hepatitis: Discriminant Function <32. Good prognosis. s/p para with 2.8L removed 7/3. Neg for SBP. LFTs improving. Ascitic fluids SAAG consistent with malignancy/pancreatic/infectious etiology  - EtOH use: Decreased Valium 5mg q12hrs + CIWA. High-dose thiamine. Folic acid. CIWA scores reviewed  - Chronic pain: UDS +Suboxone. WILBUR reviewed. Continue Suboxone, will increase back to home dose as I suspect some of his pain is due to having a decreased dose of Suboxone for the last few days.   - Hypokalemia: Replete  --TCP- likely due to cirrhosis, monitor especially while on Lovenox    DVT Prophylaxis:  pLOV    CODE STATUS:   Code Status and Medical Interventions:   Ordered at: 07/02/18 2110     Level Of Support Discussed With:    Patient     Code Status:    CPR     Medical Interventions (Level of Support Prior to Arrest):    Full       Disposition: I expect the patient to be discharged home TBKATHLEEN Best MD  07/05/18  1:55 PM

## 2018-07-05 NOTE — PROGRESS NOTES
"GI Daily Progress Note 07/05/18  S566/1  HOUSTON GUIDO is a 52 y.o. male who is admitted on 7/2/2018 for Cirrhosis (CMS/HCC) [K74.60].  Subjective   Chief Complaint:  Suprapubic pain  Reports that suprapubic pain feels like burning like the area is being on fire.  Pt has not urinate much today. ladder showed low volume per nursing.  Pt denies gross hematuria or dysuria    Diet Order   Procedures   • Diet Regular; Low Sodium, Daily Fluid Restriction; 1500 mL Fluid; Other; 120; 2,000 mg Na     ROS:  Objective   /70 (BP Location: Left arm, Patient Position: Lying)   Pulse 86   Temp 98.7 °F (37.1 °C) (Oral)   Resp 18   Ht 172.7 cm (68\")   Wt 70.7 kg (155 lb 12.8 oz)   SpO2 95%   BMI 23.69 kg/m²   Physical Exam   General Well developed; well nourished; in some distress d/t pain.   Eyes Conjunctiva pink. Scleral icterus is present.  ENT Good dentition.  Oral mucosa pink & moist without thrush or lesions.    Resp Respiration effort normal  CV No lower extremity edema  GI Abd soft, NT, abd distention, normal active bowel sounds.  No HSM.  No abd hernia   Scrotal edema.  Suprapubic area tender to touch  Skin No rash; no lesions; no bruises.    Psych Restless d/t pain.  Oriented to time, place, and person.    Lab    Results from last 7 days  Lab Units 07/04/18  0445 07/03/18  0417 07/02/18  1230   HEMOGLOBIN g/dL 11.8* 11.6* 14.2   HEMATOCRIT % 35.2* 34.1* 40.8       Results from last 7 days  Lab Units 07/05/18  0633 07/04/18  0445 07/04/18  0444 07/03/18  0417 07/02/18  1718 07/02/18  1230   WBC 10*3/mm3  --  8.66  --  8.35  --  14.39*   MCV fL  --  107.0*  --  106.6*  --  105.4*   PLATELETS 10*3/mm3  --  121*  --  138*  --  160   BUN / CREAT RATIO  23.7  --  19.0 14.3  --  10.3   INR   --   --   --   --  1.31*  --    ANION GAP mmol/L 6.0  --  3.0 7.0  --  10.0       Results from last 7 days  Lab Units 07/05/18  0633 07/04/18  0444 07/03/18  1043 07/03/18  0417 07/02/18  1230   AST (SGOT) U/L 149* 165*  --  " 172* 249*   ALT (SGPT) U/L 12 18  --  24 30   ALK PHOS U/L 240* 237*  --  246* 324*   BILIRUBIN mg/dL 2.6* 3.0*  --  3.6* 4.0*   BILIRUBIN DIRECT mg/dL 2.0* 2.2*  --   --   --    ALBUMIN g/dL 2.34* 2.33*  --  2.39* 3.01*   LIPASE U/L  --   --   --   --  26   BUN mg/dL 14 12  --  9 7*   CREATININE mg/dL 0.59* 0.63  --  0.63 0.68   EGFR IF NONAFRICN AM mL/min/1.73 145 135  --  135 123   SODIUM mmol/L 137 133  --  135 137   POTASSIUM mmol/L 4.6 3.2*  3.2* 3.7 2.7* 3.0*   MAGNESIUM mg/dL  --  2.1  --  1.2*  --    CO2 mmol/L 29.0 34.0*  --  37.0* 38.0*   PHOSPHORUS mg/dL 2.3* 2.4  --   --   --    GLUCOSE mg/dL 94 100  --  86 120*     Scheduled Meds  [START ON 7/6/2018] buprenorphine-naloxone 1 tablet Sublingual BID   diazePAM 5 mg Oral Q12H   enoxaparin 40 mg Subcutaneous Q24H   [START ON 7/6/2018] furosemide 20 mg Oral Daily   nicotine 1 patch Transdermal Q24H   [START ON 7/6/2018] spironolactone 50 mg Oral Daily   thiamine (VITAMIN B1) IVPB 500 mg Intravenous Q8H     Infusions   PRN MedsLORazepam **OR** LORazepam **OR** LORazepam **OR** LORazepam **OR** LORazepam **OR** LORazepam  •  magnesium sulfate **OR** magnesium sulfate **OR** magnesium sulfate  •  melatonin  •  ondansetron **OR** ondansetron  •  potassium chloride **OR** potassium chloride **OR** potassium chloride  •  potassium chloride  •  potassium chloride  •  sodium chloride  •  sodium chloride  Assessment/Plan   Assessment:  Acute alcoholic hepatitis, mild.    Alcohol cirrhosis with ascites. No evidence for SBP. MELD 14.  Child Celaya Score Class C  Acquired coagulopathy.  Thrombocytopenia.  Suprapubic pain and scrotal edema    Plan:     >> 2 gm sodium diet. Fluid restriction  >> Add diuretics w/ Lasix 20 mg and Aldactone 50 mg once daily  >>Pt has no immunity to Hep A and B. Will get pt vaccinated  >>Will send for UA/CS to r/o UTI. Unable to give opioids for pain d/t pt being on Suboxone.   >>Scrotal support  >>Will refer pt to liver transplant team at [facility]  if pt quits ETOH  >>Will check w/ Dr Brunner if he wants to do EGD to screen for varices, in the setting of cirrhosis and thrombocytopenia    Principal Problem:    Alcoholic hepatitis  Active Problems:    Hypertension    Alcohol use    Tobacco use    Leukocytosis    Opioid dependence (CMS/HCC)          Shona SPENCER    For further questions, pls contact [phone] or [email or web address]

## 2018-07-06 LAB
ALBUMIN SERPL-MCNC: 2.29 G/DL (ref 3.2–4.8)
ALBUMIN/GLOB SERPL: 0.7 G/DL (ref 1.5–2.5)
ALP SERPL-CCNC: 219 U/L (ref 25–100)
ALT SERPL W P-5'-P-CCNC: 15 U/L (ref 7–40)
ANION GAP SERPL CALCULATED.3IONS-SCNC: 5 MMOL/L (ref 3–11)
AST SERPL-CCNC: 140 U/L (ref 0–33)
BACTERIA UR QL AUTO: ABNORMAL /HPF
BILIRUB SERPL-MCNC: 2.3 MG/DL (ref 0.3–1.2)
BILIRUB UR QL STRIP: ABNORMAL
BUN BLD-MCNC: 19 MG/DL (ref 9–23)
BUN/CREAT SERPL: 26.4 (ref 7–25)
CALCIUM SPEC-SCNC: 8.1 MG/DL (ref 8.7–10.4)
CHLORIDE SERPL-SCNC: 99 MMOL/L (ref 99–109)
CLARITY UR: CLEAR
CO2 SERPL-SCNC: 29 MMOL/L (ref 20–31)
COLOR UR: ABNORMAL
CREAT BLD-MCNC: 0.72 MG/DL (ref 0.6–1.3)
DEPRECATED RDW RBC AUTO: 65.5 FL (ref 37–54)
ERYTHROCYTE [DISTWIDTH] IN BLOOD BY AUTOMATED COUNT: 16.5 % (ref 11.3–14.5)
GFR SERPL CREATININE-BSD FRML MDRD: 116 ML/MIN/1.73
GLOBULIN UR ELPH-MCNC: 3.5 GM/DL
GLUCOSE BLD-MCNC: 108 MG/DL (ref 70–100)
GLUCOSE UR STRIP-MCNC: NEGATIVE MG/DL
HAV AB SER QL IA: NEGATIVE
HCT VFR BLD AUTO: 36.2 % (ref 38.9–50.9)
HGB BLD-MCNC: 11.9 G/DL (ref 13.1–17.5)
HGB UR QL STRIP.AUTO: NEGATIVE
HYALINE CASTS UR QL AUTO: ABNORMAL /LPF
KETONES UR QL STRIP: ABNORMAL
LEUKOCYTE ESTERASE UR QL STRIP.AUTO: ABNORMAL
MCH RBC QN AUTO: 36 PG (ref 27–31)
MCHC RBC AUTO-ENTMCNC: 32.9 G/DL (ref 32–36)
MCV RBC AUTO: 109.4 FL (ref 80–99)
NITRITE UR QL STRIP: POSITIVE
PH UR STRIP.AUTO: 5.5 [PH] (ref 5–8)
PLATELET # BLD AUTO: 169 10*3/MM3 (ref 150–450)
PMV BLD AUTO: 12 FL (ref 6–12)
POTASSIUM BLD-SCNC: 4.2 MMOL/L (ref 3.5–5.5)
PROT SERPL-MCNC: 5.8 G/DL (ref 5.7–8.2)
PROT UR QL STRIP: ABNORMAL
RBC # BLD AUTO: 3.31 10*6/MM3 (ref 4.2–5.76)
RBC # UR: ABNORMAL /HPF
REF LAB TEST METHOD: ABNORMAL
SODIUM BLD-SCNC: 133 MMOL/L (ref 132–146)
SP GR UR STRIP: 1.03 (ref 1–1.03)
SQUAMOUS #/AREA URNS HPF: ABNORMAL /HPF
UROBILINOGEN UR QL STRIP: ABNORMAL
WBC NRBC COR # BLD: 8.95 10*3/MM3 (ref 3.5–10.8)
WBC UR QL AUTO: ABNORMAL /HPF

## 2018-07-06 PROCEDURE — 25010000002 KETOROLAC TROMETHAMINE PER 15 MG: Performed by: NURSE PRACTITIONER

## 2018-07-06 PROCEDURE — 25010000002 ENOXAPARIN PER 10 MG: Performed by: HOSPITALIST

## 2018-07-06 PROCEDURE — 81001 URINALYSIS AUTO W/SCOPE: CPT | Performed by: HOSPITALIST

## 2018-07-06 PROCEDURE — 25010000002 THIAMINE PER 100 MG: Performed by: HOSPITALIST

## 2018-07-06 PROCEDURE — 80053 COMPREHEN METABOLIC PANEL: CPT | Performed by: INTERNAL MEDICINE

## 2018-07-06 PROCEDURE — 99233 SBSQ HOSP IP/OBS HIGH 50: CPT | Performed by: INTERNAL MEDICINE

## 2018-07-06 PROCEDURE — 85027 COMPLETE CBC AUTOMATED: CPT | Performed by: INTERNAL MEDICINE

## 2018-07-06 RX ORDER — DIAZEPAM 5 MG/1
2.5 TABLET ORAL EVERY 12 HOURS
Status: DISCONTINUED | OUTPATIENT
Start: 2018-07-06 | End: 2018-07-08

## 2018-07-06 RX ADMIN — DIAZEPAM 2.5 MG: 5 TABLET ORAL at 17:21

## 2018-07-06 RX ADMIN — LORAZEPAM 1 MG: 1 TABLET ORAL at 21:51

## 2018-07-06 RX ADMIN — KETOROLAC TROMETHAMINE 15 MG: 30 INJECTION, SOLUTION INTRAMUSCULAR at 14:07

## 2018-07-06 RX ADMIN — DIAZEPAM 5 MG: 5 TABLET ORAL at 05:41

## 2018-07-06 RX ADMIN — SPIRONOLACTONE 50 MG: 50 TABLET ORAL at 08:24

## 2018-07-06 RX ADMIN — Medication 5 MG: at 21:51

## 2018-07-06 RX ADMIN — BUPRENORPHINE AND NALOXONE 1 TABLET: 8; 2 TABLET SUBLINGUAL at 11:33

## 2018-07-06 RX ADMIN — THIAMINE HYDROCHLORIDE 500 MG: 100 INJECTION, SOLUTION INTRAMUSCULAR; INTRAVENOUS at 00:15

## 2018-07-06 RX ADMIN — BUPRENORPHINE AND NALOXONE 1 TABLET: 8; 2 TABLET SUBLINGUAL at 08:24

## 2018-07-06 RX ADMIN — FUROSEMIDE 20 MG: 20 TABLET ORAL at 08:24

## 2018-07-06 RX ADMIN — NICOTINE 1 PATCH: 21 PATCH, EXTENDED RELEASE TRANSDERMAL at 08:24

## 2018-07-06 RX ADMIN — KETOROLAC TROMETHAMINE 15 MG: 30 INJECTION, SOLUTION INTRAMUSCULAR at 20:16

## 2018-07-06 RX ADMIN — ENOXAPARIN SODIUM 40 MG: 40 INJECTION SUBCUTANEOUS at 08:24

## 2018-07-06 NOTE — PLAN OF CARE
Problem: Alcohol Withdrawal Acute, Risk/Actual (Adult)  Goal: Signs and Symptoms of Listed Potential Problems Will be Absent, Minimized or Managed (Alcohol Withdrawal Acute, Risk/Actual)  Outcome: Ongoing (interventions implemented as appropriate)

## 2018-07-06 NOTE — PROGRESS NOTES
Continued Stay Note  [facility]     Patient Name: Sd Miller  MRN: [id number]  Today's Date: 7/6/2018    Admit Date: 7/2/2018          Discharge Plan     Row Name 07/06/18 1557       Plan    Plan discharge plan    Patient/Family in Agreement with Plan yes    Plan Comments Plan is home with spouse when medically ready for discharge. CM will cont to follow              Discharge Codes    No documentation.       Expected Discharge Date and Time     Expected Discharge Date Expected Discharge Time    Jul 6, 2018             Suni Montana RN

## 2018-07-06 NOTE — PROGRESS NOTES
Clinical Nutrition   Reason For Visit: Follow-up protocol    Patient Name: Sd Miller  YOB: 1965  MRN: [id number]  Date of Encounter: 07/06/18 10:58 AM  Admission date: 7/2/2018        Nutrition Assessment     Hospital Problem List  Principal Problem:    Alcoholic hepatitis  Active Problems:    Hypertension    Alcohol use    Tobacco use    Leukocytosis    Opioid dependence (CMS/HCC)        PMH: He  has a past medical history of Hypertension.   PSxH: He  has no past surgical history on file.       Other Applicable Diagnosis:  Alcoholic cirrhosis (new dx)  Right inguinal hernia  Mild ascites  Possible referral to  for liver transplant if pt quits ETOH  Possible EGD to r/o varices     Applicable medical tests/procedures since admission:  S/p paracentesis (7/3) - 2.8 L removed        Anthropometrics   Height: 68 in  Weight: 155 lbs (standing wt 7/5 per nsg documentation)  BMI: 23.7  BMI classification: Normal: 18.5-24.9kg/m2   UBW: 155 lbs (per pt)     Weight change: none based on reported UBW and current standing wt, but RD notes still some fluid retention and pt receiving diuretic      Needs Assessment   Height used: 68 in  Weight used: 155 lbs     Estimated Protein needs: 90 g pro  1.0-1.2 g/kg =  g      Labs reviewed   Labs reviewed: Yes    Medications reviewed   Medications reviewed: Yes  Pertinent: lasix      Current Nutrition Prescription   PO: Diet Regular; Low Sodium, Daily Fluid Restriction, Cardiac; 1500 mL Fluid; Other; 120; 2,000 mg Na  Oral Nutrition Supplement: Boost Plus supplement and 1/2 chicken salad sandwich for HS snack    Evaluation of Received Nutrient/Fluid Intake: 70% / 6 meals      Nutrition Diagnosis     7/4  Problem Increased nutrient needs (protein)   Etiology Condition associated with increased protein needs   Signs/Symptoms Cirrhosis (new dx this admission)   Status: ongoing     7/4  Problem Food and nutrition knowledge deficit   Etiology No prior need for edu    Signs/Symptoms New dx cirrhosis   Status: resolved, edu provided 7/4        Intervention   Intervention: Follow treatment progress, Care plan reviewed      Goal:   General: Nutrition support treatment  PO: Maintain intake      Monitoring/Evaluation:       Monitoring/Evaluation: Per protocol, PO intake, Supplement intake, Weight  Will Continue to follow per protocol  Mariecl Charles RD  Time Spent: 15 min

## 2018-07-06 NOTE — PROGRESS NOTES
[facility] Medicine Services  PROGRESS NOTE    Patient Name: Sd Miller  : 1965  MRN: [id number]    Date of Admission: 2018  Length of Stay: 4  Primary Care Physician: No Known Provider    Subjective   Subjective     CC:  FU abdominal pain    HPI:  Pain is better this am, slept better last night after being given an Ativan    Review of Systems  Gen- No fevers, chills  CV- No chest pain, palpitations  Resp- No cough, dyspnea  GI- No N/V/D, abd pain    Otherwise ROS is negative except as mentioned in the HPI.    Objective   Objective     Vital Signs:   Temp:  [97.8 °F (36.6 °C)-98.6 °F (37 °C)] 98.2 °F (36.8 °C)  Heart Rate:  [82-95] 82  Resp:  [18] 18  BP: (105-122)/(68-71) 107/68        Physical Exam:  Constitutional: No acute distress, sleeping initially but wakes up easily  Eyes: PERRLA, sclerae anicteric, no conjunctival injection  HENT: NCAT, mucous membranes moist  Neck: Supple, no thyromegaly, no lymphadenopathy, trachea midline  Respiratory: Clear to auscultation bilaterally, nonlabored respirations   Cardiovascular: RRR, no murmurs, rubs, or gallops, palpable pedal pulses bilaterally  Gastrointestinal: Positive bowel sounds, soft, mild TTP in R inguinal region, nondistended  Musculoskeletal: No bilateral ankle edema, no clubbing or cyanosis to extremities  Psychiatric: Appropriate affect, cooperative  Neurologic: Oriented x 3, strength symmetric in all extremities, Cranial Nerves grossly intact to confrontation, speech clear  Skin: No rashes    Results Reviewed:  I have personally reviewed current lab, radiology, and data and agree.      Results from last 7 days  Lab Units 18  0515 18  0445 18  0417 18  1718   WBC 10*3/mm3 8.95 8.66 8.35  --    HEMOGLOBIN g/dL 11.9* 11.8* 11.6*  --    HEMATOCRIT % 36.2* 35.2* 34.1*  --    PLATELETS 10*3/mm3 169 121* 138*  --    INR   --   --   --  1.31*       Results from last 7 days  Lab Units  18  0515 18  0633 18  0444   SODIUM mmol/L 133 137 133   POTASSIUM mmol/L 4.2 4.6 3.2*  3.2*   CHLORIDE mmol/L 99 102 96*   CO2 mmol/L 29.0 29.0 34.0*   BUN mg/dL 19 14 12   CREATININE mg/dL 0.72 0.59* 0.63   GLUCOSE mg/dL 108* 94 100   CALCIUM mg/dL 8.1* 8.0* 8.0*   ALT (SGPT) U/L 15 12 18   AST (SGOT) U/L 140* 149* 165*     No results found for: BNP  No results found for: PHART    Microbiology Results Abnormal     Procedure Component Value - Date/Time    Body Fluid Culture - Body Fluid, Peritoneum [[id number]]  (Normal) Collected:  18 1512    Lab Status:  Preliminary result Specimen:  Body Fluid from Peritoneum Updated:  18 0917     BF Culture No growth at 3 days     Gram Stain Result Few (2+) WBCs seen      No organisms seen          Imaging Results (last 24 hours)     ** No results found for the last 24 hours. **        Results for orders placed during the hospital encounter of 09/08/15   Echo - Converted    [place] [id number]     [phone]         ECHO     [id number]         Signed        PATIENT NAME:  HOUSTON GUIDO MRN:  [id number]    :  1965 ACCT#:  [id number]    ATTENDING:  BHARTI LOPEZ MD ADMIT DATE:  09/08/15    PRIMARY CARE:  CHASITY RODRIGUEZ MD LOCATION:          CC:  MITCH HASKINS MD, MD; BHARTI LOPEZ MD  ; CHASITY RODRIGUEZ MD         DATE:       2015          ECHOCARDIOGRAM          INDICATIONS:  MI.           CARDIAC DIMENSIONS:      Left atrium is 4.5 cm     Aortic root of 3 cm.     Septal wall thickness is 0.9.     Posterior wall thickness 1.1.      Left ventricular end diastolic 5.6.      Left ventricular end systolic is 3.9.     Fractional shortening is 35%.     Ejection fraction of 65%.          COMMENTS:  Left ventricular wall thickness appears to be normal.  Left     ventricular dimensions are normal. Left ventricular function is normal. No     obvious wall motion abnormality  seen.          Right ventricle appears to be of normal size.  RV function is normal.          ATRIA:  The left atrium is gxibpu-jq-xklsgrpdit enlarged. Right atrium is of     normal size.  Interatrial septum is intact.  No flow across the same.           MITRAL VALVE:  Thickened mitral valve leaflets.  Trace mitral insufficiency.      Mitral  in-flow pattern is appropriate. The E:E prime ratio is 7 with a     calculated AP of 11.          AORTIC VALVE: Trileaflet, structurally normal. No Doppler flow is appreciated.           TRICUSPID VALVE:  Structurally normal.  Mild tricuspid insufficiency.      Pulmonary pressures are about 45 mmHg.          PULMONIC VALVE: Not well visualized.            AORTIC ROOT:  Is within normal limits. There is no pericardial effusion. There     is no pericardial thickening.  There is no intracardiac mass, thrombus, or     vegetation.  The IVC appears to be dilated at 2.5 cm.      CONCLUSIONS:      1.  Technically adequate study.     2.  Normal LV systolic function. (EF 65%).     3.  Mild-to-moderate left atrial enlargement with normal left ventricular end     diastolic pressures.     4.  Mild mitral insufficiency with thickened mitral valve leaflets.      5.  Mild tricuspid insufficiency with PA systolic of 45 mmHg.                          Mitch Haskins M.D.     AK/hollis     Job ID:   [id number]     Document ID: [id number]     Revised:  0                                      DICTATED BY:      MITCH HASKINS MD    DICTATED DATE/TIME:      09/08/15 1022    TRANSCRIBED DATE/TIME:      09/08/15 1247        SIGNED:         MITCH HASKINS MD          SIGNED DATE/TIME:      09/10/15 1347        COSIGNED:             COSIGNED DATE   TIME:               I have reviewed the medications.    Assessment/Plan   Assessment / Plan     Hospital Problem List     * (Principal)Alcoholic hepatitis    Leukocytosis    Opioid dependence (CMS/HCC)    Alcohol use    Hypertension    Tobacco use           [facility]  Course to date:  Sd Miller is a 52 y.o. male with past medical history of hypertension, opioid use, tobacco use, and alcohol use presents [facility] emergency department after being referred by his PCP for worsening evaluation of abdominal pain, distention, and fluid retention.    Assessment & Plan:    - Alcoholic hepatitis: Discriminant Function <32. Good prognosis. s/p para with 2.8L removed 7/3. Neg for SBP. LFTs improving. Ascitic fluids SAAG consistent with malignancy/pancreatic/infectious etiology  - EtOH use: Decrease Valium to 2.5 mg q12hrs + CIWA. High-dose thiamine. Folic acid. CIWA scores reviewed  - Chronic pain: UDS +Suboxone. WILBUR reviewed. Continue Suboxone, will increase back to home dose as I suspect some of his pain is due to having a decreased dose of Suboxone for the last few days. Better today on home dose Suboxone. UA unremarkable.   - Hypokalemia: Replete  --TCP- likely due to cirrhosis, monitor especially while on Lovenox    DVT Prophylaxis:  pLOV    CODE STATUS:   Code Status and Medical Interventions:   Ordered at: 07/02/18 2110     Level Of Support Discussed With:    Patient     Code Status:    CPR     Medical Interventions (Level of Support Prior to Arrest):    Full       Disposition: I expect the patient to be discharged home TBKATHLEEN Best MD  07/06/18  2:51 PM

## 2018-07-06 NOTE — PLAN OF CARE
Problem: Alcohol Withdrawal Acute, Risk/Actual (Adult)  Goal: Signs and Symptoms of Listed Potential Problems Will be Absent, Minimized or Managed (Alcohol Withdrawal Acute, Risk/Actual)  Outcome: Ongoing (interventions implemented as appropriate)      Problem: Patient Care Overview  Goal: Plan of Care Review  Outcome: Ongoing (interventions implemented as appropriate)   07/06/18 0430   Coping/Psychosocial   Plan of Care Reviewed With patient;spouse   Plan of Care Review   Progress no change   OTHER   Outcome Summary pt rested well throughout night, minimal c/o pain, no c/o n/v/d, c/o anxiety intermittently, vss, ciwaa's low, will continue to monitor.     Goal: Individualization and Mutuality  Outcome: Ongoing (interventions implemented as appropriate)    Goal: Discharge Needs Assessment  Outcome: Ongoing (interventions implemented as appropriate)

## 2018-07-07 ENCOUNTER — APPOINTMENT (OUTPATIENT)
Dept: CT IMAGING | Facility: HOSPITAL | Age: 53
End: 2018-07-07

## 2018-07-07 LAB
ALBUMIN SERPL-MCNC: 2.27 G/DL (ref 3.2–4.8)
ALBUMIN/GLOB SERPL: 0.7 G/DL (ref 1.5–2.5)
ALP SERPL-CCNC: 191 U/L (ref 25–100)
ALT SERPL W P-5'-P-CCNC: 18 U/L (ref 7–40)
ANION GAP SERPL CALCULATED.3IONS-SCNC: 4 MMOL/L (ref 3–11)
AST SERPL-CCNC: 141 U/L (ref 0–33)
BILIRUB SERPL-MCNC: 2.1 MG/DL (ref 0.3–1.2)
BUN BLD-MCNC: 23 MG/DL (ref 9–23)
BUN/CREAT SERPL: 35.9 (ref 7–25)
CALCIUM SPEC-SCNC: 8.3 MG/DL (ref 8.7–10.4)
CHLORIDE SERPL-SCNC: 103 MMOL/L (ref 99–109)
CO2 SERPL-SCNC: 29 MMOL/L (ref 20–31)
CREAT BLD-MCNC: 0.64 MG/DL (ref 0.6–1.3)
DEPRECATED RDW RBC AUTO: 63.6 FL (ref 37–54)
ERYTHROCYTE [DISTWIDTH] IN BLOOD BY AUTOMATED COUNT: 16.3 % (ref 11.3–14.5)
GFR SERPL CREATININE-BSD FRML MDRD: 132 ML/MIN/1.73
GLOBULIN UR ELPH-MCNC: 3.3 GM/DL
GLUCOSE BLD-MCNC: 88 MG/DL (ref 70–100)
HCT VFR BLD AUTO: 34.6 % (ref 38.9–50.9)
HGB BLD-MCNC: 11.5 G/DL (ref 13.1–17.5)
MCH RBC QN AUTO: 36.2 PG (ref 27–31)
MCHC RBC AUTO-ENTMCNC: 33.2 G/DL (ref 32–36)
MCV RBC AUTO: 108.8 FL (ref 80–99)
PLATELET # BLD AUTO: 213 10*3/MM3 (ref 150–450)
PMV BLD AUTO: 11.9 FL (ref 6–12)
POTASSIUM BLD-SCNC: 4.3 MMOL/L (ref 3.5–5.5)
PROT SERPL-MCNC: 5.6 G/DL (ref 5.7–8.2)
RBC # BLD AUTO: 3.18 10*6/MM3 (ref 4.2–5.76)
SODIUM BLD-SCNC: 136 MMOL/L (ref 132–146)
WBC NRBC COR # BLD: 8.69 10*3/MM3 (ref 3.5–10.8)

## 2018-07-07 PROCEDURE — 25010000002 ENOXAPARIN PER 10 MG: Performed by: HOSPITALIST

## 2018-07-07 PROCEDURE — 99233 SBSQ HOSP IP/OBS HIGH 50: CPT | Performed by: NURSE PRACTITIONER

## 2018-07-07 PROCEDURE — 85027 COMPLETE CBC AUTOMATED: CPT | Performed by: INTERNAL MEDICINE

## 2018-07-07 PROCEDURE — 74176 CT ABD & PELVIS W/O CONTRAST: CPT

## 2018-07-07 PROCEDURE — 25010000002 KETOROLAC TROMETHAMINE PER 15 MG: Performed by: NURSE PRACTITIONER

## 2018-07-07 PROCEDURE — 80053 COMPREHEN METABOLIC PANEL: CPT | Performed by: INTERNAL MEDICINE

## 2018-07-07 PROCEDURE — 99232 SBSQ HOSP IP/OBS MODERATE 35: CPT | Performed by: INTERNAL MEDICINE

## 2018-07-07 RX ORDER — IBUPROFEN 600 MG/1
600 TABLET ORAL EVERY 6 HOURS PRN
Status: DISCONTINUED | OUTPATIENT
Start: 2018-07-07 | End: 2018-07-07

## 2018-07-07 RX ORDER — TAMSULOSIN HYDROCHLORIDE 0.4 MG/1
0.4 CAPSULE ORAL DAILY
Status: DISCONTINUED | OUTPATIENT
Start: 2018-07-07 | End: 2018-07-08 | Stop reason: HOSPADM

## 2018-07-07 RX ORDER — FUROSEMIDE 40 MG/1
40 TABLET ORAL DAILY
Status: DISCONTINUED | OUTPATIENT
Start: 2018-07-08 | End: 2018-07-08 | Stop reason: HOSPADM

## 2018-07-07 RX ORDER — FUROSEMIDE 20 MG/1
20 TABLET ORAL ONCE
Status: COMPLETED | OUTPATIENT
Start: 2018-07-07 | End: 2018-07-07

## 2018-07-07 RX ADMIN — SPIRONOLACTONE 50 MG: 50 TABLET ORAL at 08:36

## 2018-07-07 RX ADMIN — LORAZEPAM 1 MG: 1 TABLET ORAL at 00:23

## 2018-07-07 RX ADMIN — DIAZEPAM 2.5 MG: 5 TABLET ORAL at 17:16

## 2018-07-07 RX ADMIN — NICOTINE 1 PATCH: 21 PATCH, EXTENDED RELEASE TRANSDERMAL at 08:36

## 2018-07-07 RX ADMIN — ENOXAPARIN SODIUM 40 MG: 40 INJECTION SUBCUTANEOUS at 08:35

## 2018-07-07 RX ADMIN — FUROSEMIDE 20 MG: 20 TABLET ORAL at 08:36

## 2018-07-07 RX ADMIN — KETOROLAC TROMETHAMINE 15 MG: 30 INJECTION, SOLUTION INTRAMUSCULAR at 01:46

## 2018-07-07 RX ADMIN — DIAZEPAM 2.5 MG: 5 TABLET ORAL at 05:29

## 2018-07-07 RX ADMIN — BUPRENORPHINE AND NALOXONE 1 TABLET: 8; 2 TABLET SUBLINGUAL at 20:12

## 2018-07-07 RX ADMIN — TAMSULOSIN HYDROCHLORIDE 0.4 MG: 0.4 CAPSULE ORAL at 14:09

## 2018-07-07 RX ADMIN — BUPRENORPHINE AND NALOXONE 1 TABLET: 8; 2 TABLET SUBLINGUAL at 08:36

## 2018-07-07 RX ADMIN — FUROSEMIDE 20 MG: 20 TABLET ORAL at 14:09

## 2018-07-07 NOTE — PROGRESS NOTES
"GI Daily Progress Note  Subjective     Houston Guido is a 52 y.o. male who was admitted with Alcoholic hepatitis.   Feels some better today. C/O right groin pain.  Eating well including bedtime snack    Chief Complaint:  abd swelling    Objective     /65 (BP Location: Left arm, Patient Position: Lying)   Pulse 82   Temp 97.7 °F (36.5 °C) (Oral)   Resp 16   Ht 172.7 cm (68\")   Wt 70.7 kg (155 lb 12.8 oz)   SpO2 95%   BMI 23.69 kg/m²      Intake/Output last 3 shifts:  I/O last 3 completed shifts:  In: 940 [P.O.:840; IV Piggyback:100]  Out: 670 [Urine:670]  Intake/Output this shift:  No intake/output data recorded.      Physical Exam  Wt Readings from Last 3 Encounters:   07/05/18 70.7 kg (155 lb 12.8 oz)   ,body mass index is 23.69 kg/m².,@FLOWAMB(6)@,@FLOWAMB(5)@,@FLOWAMB(8)@   CONSTITUTIONAL:  Resp CTA; no rhonchi, rales, or wheezes.  Respiration effort normal  CV RRR; no M/R/G. No lower extremity edema  GI Abd soft, NT, ND, normal active bowel sounds.                     Small right hernia- reduce and instructed how   Psych: Awake and alert    DATA:Results for HOUSTON GUIDO (MRN [id number]) as of 7/7/2018 12:42   Ref. Range 7/4/2018 04:44 7/4/2018 04:44 7/5/2018 06:33 7/6/2018 05:15 7/7/2018 05:16   Alkaline Phosphatase Latest Ref Range: 25 - 100 U/L 237 (H)  240 (H) 219 (H) 191 (H)   Total Protein Latest Ref Range: 5.7 - 8.2 g/dL 6.0  6.0 5.8 5.6 (L)   ALT (SGPT) Latest Ref Range: 7 - 40 U/L 18  12 15 18   AST (SGOT) Latest Ref Range: 0 - 33 U/L 165 (H)  149 (H) 140 (H) 141 (H)   Total Bilirubin Latest Ref Range: 0.3 - 1.2 mg/dL 3.0 (H)  2.6 (H) 2.3 (H) 2.1 (H)   Albumin Latest Ref Range: 3.20 - 4.80 g/dL 2.33 (L)  2.34 (L) 2.29 (L) 2.27 (L)       Assessment/Plan       Acute alcoholic hepatitis, mild.    Alcohol cirrhosis with ascites. No evidence for SBP. MELD 14.  Child Celaya Score Class C  R inguinal hernia  No bowel there on CT but felt better after reduced    Slowly improving.  Continue " current treatment.      Right groin pain better after being reduced.  No bowel there on CT. Should get better after ascites resolves  Needs opt counseling at [place]  Will need fu vaccination  Can see TJ Vicente in GI clinic    Principal Problem:    Alcoholic hepatitis  Active Problems:    Hypertension    Alcohol use    Tobacco use    Leukocytosis    Opioid dependence (CMS/HCC)       LOS: 5 days     Trey Crane MD  07/07/18  12:57 PM

## 2018-07-07 NOTE — PROGRESS NOTES
[facility] Medicine Services  PROGRESS NOTE    Patient Name: Sd Miller  : 1965  MRN: [id number]    Date of Admission: 2018  Length of Stay: 5  Primary Care Physician: No Known Provider    Subjective   Subjective     CC:  FU abdominal pain    HPI:  Worsening right supra pubic pain, feels like swelling is worse, had trouble voiding last night as well as trouble with cath placement.    Review of Systems  Gen- No fevers, chills  CV- No chest pain, palpitations  Resp- No cough, dyspnea  GI- No N/V/D, abd pain    Otherwise ROS is negative except as mentioned in the HPI.    Objective   Objective     Vital Signs:   Temp:  [97.7 °F (36.5 °C)-98.1 °F (36.7 °C)] 97.7 °F (36.5 °C)  Heart Rate:  [82-89] 82  Resp:  [16-18] 16  BP: (107-115)/(65-72) 107/65        Physical Exam:  Gen-no acute distress, resting ok  CV-RRR, S1 S2 normal, no m/r/g  Resp-CTAB, normal WOB  Abd-firm, + BS, non tender  - right groin TTP, mild puffiness of right groin noted, with mild right testicular swelling, no testicular pain  Ext-no BLE, PPP  Neuro-A&Ox3, no focal deficits  Psych-appropriate mood    Results Reviewed:  I have personally reviewed current lab, radiology, and data and agree.      Results from last 7 days  Lab Units 18  0516 18  0515 18  0445  18  1718   WBC 10*3/mm3 8.69 8.95 8.66  < >  --    HEMOGLOBIN g/dL 11.5* 11.9* 11.8*  < >  --    HEMATOCRIT % 34.6* 36.2* 35.2*  < >  --    PLATELETS 10*3/mm3 213 169 121*  < >  --    INR   --   --   --   --  1.31*   < > = values in this interval not displayed.    Results from last 7 days  Lab Units 18  0516 18  0515 18  0633   SODIUM mmol/L 136 133 137   POTASSIUM mmol/L 4.3 4.2 4.6   CHLORIDE mmol/L 103 99 102   CO2 mmol/L 29.0 29.0 29.0   BUN mg/dL 23 19 14   CREATININE mg/dL 0.64 0.72 0.59*   GLUCOSE mg/dL 88 108* 94   CALCIUM mg/dL 8.3* 8.1* 8.0*   ALT (SGPT) U/L 18 15 12   AST (SGOT) U/L 141* 140* 149*        Microbiology Results Abnormal     Procedure Component Value - Date/Time    Body Fluid Culture - Body Fluid, Peritoneum [[id number]]  (Normal) Collected:  07/03/18 1512    Lab Status:  Preliminary result Specimen:  Body Fluid from Peritoneum Updated:  07/07/18 0828     BF Culture No growth at 4 days     Gram Stain Result Few (2+) WBCs seen      No organisms seen          I have reviewed the medications.    Assessment/Plan   Assessment / Plan     Hospital Problem List     * (Principal)Alcoholic hepatitis    Hypertension    Alcohol use    Tobacco use    Leukocytosis    Opioid dependence (CMS/HCC)           Brief Hospital Course to date:  Sd Miller is a 52 y.o. male with past medical history of hypertension, opioid use, tobacco use, and alcohol use presents [facility] emergency department after being referred by his PCP for worsening evaluation of abdominal pain, distention, and fluid retention. Found to have acute etoh hepatitis with new cirrhosis.     Assessment & Plan:  --Discriminant Function <32. s/p para with 2.8L removed 7/3. Neg for SBP. LFTs improving. Ascitic fluids SAAG consistent with malignancy/pancreatic/infectious etiology  --GI following along due to new cirrhosis   --Due to acute worsening pain will repeat CT A/P, may need another para vs increase in Lasix, doubt this is SBP given pain is localized in right groin   --right inguinal hernia likely worsening pain due to edema  --start flomax, monitor urinary retention, anchor arora if needed, Urine micro noted and unremarkable  --Continue Valium to 2.5 mg q12hrs + CIWA. High-dose thiamine. Folic acid.  -- UDS +Suboxone. WILBUR reviewed. Continue Suboxone, will increase back to home dose as I suspect some of his pain is due to having a decreased dose of Suboxone for the last few days.   - replete electrolytes prn  --TCP has resolved and likely worsened by cirrohsis    DVT Prophylaxis:  pLOV    CODE STATUS:   Code Status and Medical  Interventions:   Ordered at: 07/02/18 2110     Level Of Support Discussed With:    Patient     Code Status:    CPR     Medical Interventions (Level of Support Prior to Arrest):    Full       Disposition: I expect the patient to be discharged home in a few days    TJ Alonzo  07/07/18  10:31 AM

## 2018-07-08 VITALS
HEART RATE: 79 BPM | WEIGHT: 155.8 LBS | TEMPERATURE: 98.7 F | DIASTOLIC BLOOD PRESSURE: 70 MMHG | OXYGEN SATURATION: 95 % | SYSTOLIC BLOOD PRESSURE: 103 MMHG | BODY MASS INDEX: 23.61 KG/M2 | RESPIRATION RATE: 18 BRPM | HEIGHT: 68 IN

## 2018-07-08 PROBLEM — K40.90 INGUINAL HERNIA: Status: ACTIVE | Noted: 2018-07-08

## 2018-07-08 PROBLEM — B19.20 HEPATITIS C: Status: ACTIVE | Noted: 2018-07-08

## 2018-07-08 PROBLEM — R33.9 URINARY RETENTION: Status: RESOLVED | Noted: 2018-07-08 | Resolved: 2018-07-08

## 2018-07-08 PROBLEM — R33.9 URINARY RETENTION: Status: ACTIVE | Noted: 2018-07-08

## 2018-07-08 PROBLEM — D72.829 LEUKOCYTOSIS: Status: RESOLVED | Noted: 2018-07-03 | Resolved: 2018-07-08

## 2018-07-08 PROCEDURE — 90471 IMMUNIZATION ADMIN: CPT | Performed by: NURSE PRACTITIONER

## 2018-07-08 PROCEDURE — 99239 HOSP IP/OBS DSCHRG MGMT >30: CPT | Performed by: NURSE PRACTITIONER

## 2018-07-08 PROCEDURE — 25010000002 ENOXAPARIN PER 10 MG: Performed by: HOSPITALIST

## 2018-07-08 PROCEDURE — 90744 HEPB VACC 3 DOSE PED/ADOL IM: CPT | Performed by: NURSE PRACTITIONER

## 2018-07-08 PROCEDURE — 25010000002 HEPATITIS A 1440 EL U/ML SUSPENSION: Performed by: NURSE PRACTITIONER

## 2018-07-08 PROCEDURE — 99232 SBSQ HOSP IP/OBS MODERATE 35: CPT | Performed by: INTERNAL MEDICINE

## 2018-07-08 PROCEDURE — 90632 HEPA VACCINE ADULT IM: CPT | Performed by: NURSE PRACTITIONER

## 2018-07-08 PROCEDURE — 25010000002 HEPATITIS B VACCINE (RECOMBINANT) 10 MCG/0.5ML SUSPENSION: Performed by: NURSE PRACTITIONER

## 2018-07-08 RX ORDER — FUROSEMIDE 40 MG/1
40 TABLET ORAL DAILY
Qty: 30 TABLET | Refills: 0 | Status: SHIPPED | OUTPATIENT
Start: 2018-07-09 | End: 2018-09-12

## 2018-07-08 RX ORDER — SPIRONOLACTONE 100 MG/1
100 TABLET, FILM COATED ORAL DAILY
Qty: 30 TABLET | Refills: 0 | Status: SHIPPED | OUTPATIENT
Start: 2018-07-08 | End: 2018-09-12 | Stop reason: HOSPADM

## 2018-07-08 RX ORDER — DIAZEPAM 2 MG/1
2 TABLET ORAL DAILY
Status: DISCONTINUED | OUTPATIENT
Start: 2018-07-08 | End: 2018-07-08 | Stop reason: HOSPADM

## 2018-07-08 RX ORDER — NICOTINE 21 MG/24HR
1 PATCH, TRANSDERMAL 24 HOURS TRANSDERMAL EVERY 24 HOURS
Qty: 28 PATCH | Refills: 0 | Status: SHIPPED | OUTPATIENT
Start: 2018-07-09

## 2018-07-08 RX ORDER — DIAZEPAM 2 MG/1
2 TABLET ORAL DAILY
Qty: 3 TABLET | Refills: 0 | Status: SHIPPED | OUTPATIENT
Start: 2018-07-09 | End: 2018-09-12 | Stop reason: HOSPADM

## 2018-07-08 RX ADMIN — ENOXAPARIN SODIUM 40 MG: 40 INJECTION SUBCUTANEOUS at 08:26

## 2018-07-08 RX ADMIN — DIAZEPAM 2.5 MG: 5 TABLET ORAL at 05:52

## 2018-07-08 RX ADMIN — HEPATITIS B VACCINE (RECOMBINANT) 20 MCG: 10 INJECTION, SUSPENSION INTRAMUSCULAR at 14:01

## 2018-07-08 RX ADMIN — SPIRONOLACTONE 50 MG: 50 TABLET ORAL at 09:45

## 2018-07-08 RX ADMIN — TAMSULOSIN HYDROCHLORIDE 0.4 MG: 0.4 CAPSULE ORAL at 09:44

## 2018-07-08 RX ADMIN — DIAZEPAM 2 MG: 2 TABLET ORAL at 08:31

## 2018-07-08 RX ADMIN — FUROSEMIDE 40 MG: 40 TABLET ORAL at 08:24

## 2018-07-08 RX ADMIN — NICOTINE 1 PATCH: 21 PATCH, EXTENDED RELEASE TRANSDERMAL at 08:32

## 2018-07-08 RX ADMIN — BUPRENORPHINE AND NALOXONE 1 TABLET: 8; 2 TABLET SUBLINGUAL at 08:24

## 2018-07-08 RX ADMIN — HEPATITIS A VACCINE 1440 UNITS: 1440 INJECTION, SUSPENSION INTRAMUSCULAR at 14:03

## 2018-07-08 NOTE — PROGRESS NOTES
"GI Daily Progress Note  Subjective     Sd Miller is a 52 y.o. male who was admitted with Alcoholic hepatitis.   Feeling better. Groin pain is gone.  Voiding more.  Eating well    Chief Complaint:  Scrotal swelling    Objective     /70   Pulse 79   Temp 98.7 °F (37.1 °C) (Axillary)   Resp 18   Ht 172.7 cm (68\")   Wt 70.7 kg (155 lb 12.8 oz)   SpO2 95%   BMI 23.69 kg/m²     Intake/Output last 3 shifts:  I/O last 3 completed shifts:  In: 1920 [P.O.:1920]  Out: 845 [Urine:845]  Intake/Output this shift:  No intake/output data recorded.      Physical Exam  Wt Readings from Last 3 Encounters:   07/05/18 70.7 kg (155 lb 12.8 oz)   ,body mass index is 23.69 kg/m².,@FLOWAMB(6)@,@FLOWAMB(5)@,@FLOWAMB(8)@   CONSTITUTIONAL:  Resp CTA; no rhonchi, rales, or wheezes.  Respiration effort normal  CV RRR; no M/R/G. No lower extremity edema   GI Abd soft, NT, ND, normal active bowel sounds.  mod ascites  Psych: Awake and alert  DATA:    Assessment/Plan       Acute alcoholic hepatitis, mild.    Alcohol cirrhosis with ascites. No evidence for SBP. MELD 14.  Child Celaya Score Class C  R inguinal hernia  No bowel there on CT but felt better after reduced    OK for DC.  Needs protein bedtime snack, watch salt intake. Avoid ETOH.  Get counseling  Needs vaccine for Hep A & B prior to DC.  Increase aldactone to 10mg  FU with Shona Noland in 2 weeks    Principal Problem:    Alcoholic hepatitis  Active Problems:    Hypertension    Alcohol use    Tobacco use    Leukocytosis    Opioid dependence (CMS/HCC)       LOS: 6 days     Trey Crane MD  07/08/18  9:19 AM  "

## 2018-07-08 NOTE — DISCHARGE INSTRUCTIONS
Drink 3-5 cups of black coffee a day  No Alcohol  Eat a protein bedtime snack  Follow weight   Watch salt intake  (1500mg a day)

## 2018-07-08 NOTE — DISCHARGE SUMMARY
[facility] Medicine Services  DISCHARGE SUMMARY    Patient Name: Sd Miller  : 1965  MRN: [id number]    Date of Admission: 2018  Date of Discharge:  2018  Primary Care Physician: No Known Provider    Consults     Date and Time Order Name Status Description    7/3/2018 0941 Inpatient General Surgery Consult Completed     2018 2110 Inpatient Gastroenterology Consult Completed         Hospital Course     Presenting Problem:   Cirrhosis (CMS/HCC) [K74.60]    Active Hospital Problems    Diagnosis Date Noted   • **Alcoholic hepatitis [K70.10] 2018   • Hepatitis C [B19.20] 2018   • Inguinal hernia [K40.90] 2018   • Opioid dependence (CMS/HCC) [F11.20] 2018   • Hypertension [I10] 2018   • Alcohol use [Z78.9] 2018   • Tobacco use [Z72.0] 2018      Resolved Hospital Problems    Diagnosis Date Noted Date Resolved   • Urinary retention [R33.9] 2018   • Leukocytosis [D72.829] 2018          Hospital Course:  Sd Miller is a 52 y.o. male past medical history of hypertension, opioid use, tobacco use, and alcohol use presents [facility] emergency department after being referred by his PCP for worsening evaluation of abdominal pain, distention, and fluid retention. Found to have acute etoh hepatitis with new cirrhosis. Discriminant Function <32. He underwent paracentesis with 2.8L removed 7/3. Neg for SBP. LFTs improving. Gastroenterology followed along.  He was started on Lasix and spironolactone. Ascites has remained stable with no further need for paracentesis.  He will receive hepatitis A and B vaccines before leaving the hospital today.  Dr. Crane has arranged outpatient follow-up with Shona Noland (gastroenterology APRN).  He needs a protein bedtime snack and to watch his salt intake. He needs to stop drinking.    He did receive alcohol withdrawal protocol.  He was receiving  scheduled Valium and this is been tapered down.  He can have 3 more days of once daily value and then he can stop.  He's been advised to stop drinking.  Social work has seen.  Patient wishes to discuss treatment options with his PCP.  Outpatient counseling has been recommended.    He did have right groin pain due to a right inguinal hernia.  This was reduced this admission.  Hernia is likely worsened due to ascites.  Pain improved after reduction.  He did have problems with urinary retention but that seems to have resolved as well.  He did have thrombocytopenia that has resolved and was felt to be worsened by his cirrhosis.    Discharge Follow Up Recommendations for labs/diagnostics:  PCP in 1 week, Re: Hospital f/u  Esther Noland in 2 weeks, Re: Cirrhosis (Dr. Gomez arranged)        Day of Discharge     HPI: groin pain is much better than yesterday, agreeable to discharge    Review of Systems  Gen- No fevers, chills  CV- No chest pain, palpitations  Resp- No cough, dyspnea  GI- No N/V/D, abd pain    Otherwise ROS is negative except as mentioned in the HPI.    Vital Signs:   Temp:  [98.6 °F (37 °C)-98.7 °F (37.1 °C)] 98.7 °F (37.1 °C)  Heart Rate:  [79-84] 79  Resp:  [18] 18  BP: (103-108)/(65-70) 103/70     Physical Exam:  Gen-no acute distress, resting in bed  CV-RRR, S1 S2 normal, no m/r/g  Resp-CTAB, normal WOB  Abd-soft, NT, mild distention, +BS  Ext-no BLE edema  Neuro-A&Ox3, no focal deficits  Psych-appropriate mood      Pertinent  and/or Most Recent Results       Results from last 7 days  Lab Units 07/07/18  0516 07/06/18  0515 07/05/18  0633 07/04/18  0445 07/04/18  0444 07/03/18  1043 07/03/18  0417 07/02/18  1230   WBC 10*3/mm3 8.69 8.95  --  8.66  --   --  8.35 14.39*   HEMOGLOBIN g/dL 11.5* 11.9*  --  11.8*  --   --  11.6* 14.2   HEMATOCRIT % 34.6* 36.2*  --  35.2*  --   --  34.1* 40.8   PLATELETS 10*3/mm3 213 169  --  121*  --   --  138* 160   SODIUM mmol/L 136 133 137  --  133  --  135 137    POTASSIUM mmol/L 4.3 4.2 4.6  --  3.2*  3.2* 3.7 2.7* 3.0*   CHLORIDE mmol/L 103 99 102  --  96*  --  91* 89*   CO2 mmol/L 29.0 29.0 29.0  --  34.0*  --  37.0* 38.0*   BUN mg/dL 23 19 14  --  12  --  9 7*   CREATININE mg/dL 0.64 0.72 0.59*  --  0.63  --  0.63 0.68   GLUCOSE mg/dL 88 108* 94  --  100  --  86 120*   CALCIUM mg/dL 8.3* 8.1* 8.0*  --  8.0*  --  7.9* 8.7       Results from last 7 days  Lab Units 07/07/18  0516 07/06/18  0515 07/05/18  0633 07/04/18  0444 07/03/18  0417 07/02/18  1718 07/02/18  1230   BILIRUBIN mg/dL 2.1* 2.3* 2.6* 3.0* 3.6*  --  4.0*   ALK PHOS U/L 191* 219* 240* 237* 246*  --  324*   ALT (SGPT) U/L 18 15 12 18 24  --  30   AST (SGOT) U/L 141* 140* 149* 165* 172*  --  249*   PROTIME Seconds  --   --   --   --   --  13.8*  --    INR   --   --   --   --   --  1.31*  --    APTT seconds  --   --   --   --   --  26.4  --          Brief Urine Lab Results  (Last result in the past 365 days)      Color   Clarity   Blood   Leuk Est   Nitrite   Protein   CREAT   Urine HCG        07/06/18 1819 Dark Yellow(A) Clear Negative Small (1+)(A) Positive(A) 30 mg/dL (1+)(A)               Microbiology Results Abnormal     Procedure Component Value - Date/Time    Body Fluid Culture - Body Fluid, Peritoneum [[id number]]  (Normal) Collected:  07/03/18 1512    Lab Status:  Preliminary result Specimen:  Body Fluid from Peritoneum Updated:  07/08/18 0848     BF Culture No growth at 5 days     Gram Stain Result Few (2+) WBCs seen      No organisms seen          Imaging Results (all)     Procedure Component Value Units Date/Time    CT Abdomen Pelvis Without Contrast [[id number]] Collected:  07/07/18 1153     Updated:  07/07/18 1330    Narrative:       EXAMINATION: CT ABDOMEN PELVIS WO CONTRAST - 7/7/2018     INDICATION: Abdominal pain.     TECHNIQUE: Multiple axial CT imaging is obtained of the abdomen and  pelvis without the administration of oral or intravenous contrast.     The radiation dose reduction device  was turned on for each scan per the  ALARA (As Low as Reasonably Achievable) protocol.     COMPARISON: 7/2/2018.     FINDINGS: Mild increased markings at the right lung base with tiny right  pleural effusion. There is decrease seen in the amount of fluid within  the abdomen and pelvis when compared to the prior study. The liver  remains enlarged. No definite stone seen within the gallbladder with  vicarious excretion of contrast seen in the gallbladder. Spleen is  homogeneous. Pancreas is unremarkable. Kidneys and adrenal glands within  normal limits. The abdominal portion gastrointestinal tract is within  normal limits. Small lymph nodes again seen within the retroperitoneum  which are stable. The abdominal portion gastrointestinal tract is within  normal limits. There is again a small amount of free fluid seen within  the abdomen and pelvis. No extraluminal air.     PELVIS: Small amount of fluid seen within the pelvis. The pelvic portion  of the gastrointestinal tract is within normal limits. No free  intraperitoneal air. No abnormal mass identified. The bony structures  reveal degenerative changes seen within the spine and pelvis.       Impression:       Enlargement again seen of the liver. There is some fluid  identified within the abdomen and pelvis,  decreased in amount when  compared to the prior study. No other new findings in the interval.     DICTATED:   7/7/2018  EDITED/ls :   7/7/2018        CT Guided Paracentesis [[id number]] Collected:  07/03/18 1634     Updated:  07/03/18 1642    Narrative:       EXAMINATION: CT-guided paracentesis     INDICATION: Ascites; K70.31-Alcoholic cirrhosis of liver with ascites;  R10.84-Generalized abdominal pain; R79.89-Other specified abnormal  findings of blood chemistry; R00.0-Tachycardia, unspecified     TECHNIQUE: 5 mm low-dose CT images through the pelvis for imaging  guidance.     The radiation dose reduction device was turned on for each scan per the  ALARA (As Low  as Reasonably Achievable) protocol.     COMPARISON: NONE     FINDINGS: Informed written consent was obtained from the patient prior  to beginning. The procedure was discussed in detail including potential  risks of bleeding and infection as well as how bleeding complications  are managed. The patient indicated understanding and agreed  to proceed.     With the patient in the supine position on the CT scan table, the skin  overlying the pelvis was marked, prepped and draped in sterile fashion  and 1% lidocaine infiltrated into the skin and subcutaneous tissues as  local anesthesia using a 1-1/2 inch 25-gauge needle    . Then using a  14-gauge centesis catheter, abdominopelvic fluid collection was accessed  under direct fluoroscopic guidance with straw-colored fluid return. A  sample was taken and sent to the lab. Then the catheter was connected to  wall suction and 2.8 L more fluid were drained. Follow-up scan shows no  evidence of hemorrhage. The catheter was then removed. The patient  tolerated the procedure very well and there were no immediate  complications.       Impression:          CT-guided paracentesis.        This report was finalized on 7/3/2018 4:40 PM by Lizandro Loo.       CT Abdomen Pelvis With & Without Contrast [[id number]] Collected:  07/03/18 0914     Updated:  07/03/18 0917    Narrative:       EXAMINATION: CT ABDOMEN AND PELVIS W WO CONTRAST-      INDICATION: Liver protocol - cirrhosis, ascites, diffuse abdominal pain,  vomiting.      TECHNIQUE: CT scan of the abdomen and pelvis was performed prior to and  following intravenous contrast.      The radiation dose reduction device was turned on for each scan per the  ALARA (As Low as Reasonably Achievable) protocol.     COMPARISON: None.     FINDINGS: The most superior images demonstrate no basilar pulmonary  inflammatory process or pleural effusion.      The liver demonstrates a severe pattern of diffuse fatty infiltration.  There is a trace of  ascitic fluid. There is no adrenal mass. The  pancreas is normal. There is no renal mass, stone or obstruction.  There  is ascites. There is no aneurysm or retroperitoneal lymphadenopathy.       Impression:       There is severe diffuse fatty infiltration of the liver and  the spleen is at the upper limits of normal. There is ascites.     D:  07/02/2018  E:  07/03/2018        This report was finalized on 7/3/2018 9:15 AM by Dr. John Benedict MD.                        Order Current Status    Body Fluid Culture - Body Fluid, Peritoneum Preliminary result        Discharge Details        Discharge Medications      New Medications      Instructions Start Date   diazePAM 2 MG tablet  Commonly known as:  VALIUM   2 mg, Oral, Daily   Start Date:  7/9/2018     nicotine 21 MG/24HR patch  Commonly known as:  NICODERM CQ   1 patch, Transdermal, Every 24 Hours   Start Date:  7/9/2018     spironolactone 100 MG tablet  Commonly known as:  ALDACTONE   100 mg, Oral, Daily         Changes to Medications      Instructions Start Date   furosemide 40 MG tablet  Commonly known as:  LASIX  What changed:  · medication strength  · how much to take  · when to take this   40 mg, Oral, Daily   Start Date:  7/9/2018        Continue These Medications      Instructions Start Date   buprenorphine-naloxone 8-2 MG per SL tablet  Commonly known as:  SUBOXONE   2 tablets, Sublingual, Daily         Stop These Medications    amLODIPine 10 MG tablet  Commonly known as:  NORVASC     hydrochlorothiazide 12.5 MG tablet  Commonly known as:  HYDRODIURIL          Discharge Disposition:  Home or Self Care    Discharge Diet:   Drink 3-5 cups of black coffee a day  No Alcohol  Eat a protein bedtime snack  Follow weight   Watch salt intake  (1500mg a day)    Discharge Activity: as tolerated    Code Status/Level of Support:  Code Status and Medical Interventions:   Ordered at: 07/02/18 2110     Level Of Support Discussed With:    Patient     Code Status:    CPR      Medical Interventions (Level of Support Prior to Arrest):    Full       Additional Instructions for the Follow-ups that You Need to Schedule     Discharge Follow-up with PCP    As directed      Currently Documented PCP:  No Known Provider  PCP Phone Number:  None    Follow Up Details:  PCP in 1 week               Time Spent on Discharge:  45 minutes    Electronically signed by TJ Alonzo, 07/08/18, 9:44 AM.

## 2018-07-08 NOTE — PLAN OF CARE
Problem: Alcohol Withdrawal Acute, Risk/Actual (Adult)  Goal: Signs and Symptoms of Listed Potential Problems Will be Absent, Minimized or Managed (Alcohol Withdrawal Acute, Risk/Actual)  Outcome: Outcome(s) achieved Date Met: 07/08/18      Problem: Patient Care Overview  Goal: Individualization and Mutuality  Outcome: Outcome(s) achieved Date Met: 07/08/18    Goal: Discharge Needs Assessment  Outcome: Outcome(s) achieved Date Met: 07/08/18    Goal: Interprofessional Rounds/Family Conf  Outcome: Outcome(s) achieved Date Met: 07/08/18

## 2018-07-08 NOTE — PLAN OF CARE
Problem: Patient Care Overview  Goal: Plan of Care Review  Outcome: Outcome(s) achieved Date Met: 07/08/18

## 2018-07-08 NOTE — PLAN OF CARE
Problem: Fall Risk (Adult)  Goal: Identify Related Risk Factors and Signs and Symptoms  Outcome: Outcome(s) achieved Date Met: 07/08/18    Goal: Absence of Fall  Outcome: Outcome(s) achieved Date Met: 07/08/18

## 2018-07-10 LAB
BACTERIA FLD CULT: NORMAL
GRAM STN SPEC: NORMAL
GRAM STN SPEC: NORMAL

## 2018-07-11 ENCOUNTER — OFFICE VISIT (OUTPATIENT)
Dept: GASTROENTEROLOGY | Facility: CLINIC | Age: 53
End: 2018-07-11

## 2018-07-11 VITALS
DIASTOLIC BLOOD PRESSURE: 78 MMHG | TEMPERATURE: 99.4 F | BODY MASS INDEX: 23.28 KG/M2 | WEIGHT: 153.6 LBS | SYSTOLIC BLOOD PRESSURE: 122 MMHG | OXYGEN SATURATION: 96 % | HEART RATE: 119 BPM | HEIGHT: 68 IN

## 2018-07-11 DIAGNOSIS — R10.2 SUPRAPUBIC PAIN: ICD-10-CM

## 2018-07-11 DIAGNOSIS — K70.0 ALCOHOLIC FATTY LIVER: ICD-10-CM

## 2018-07-11 DIAGNOSIS — K70.31 ALCOHOLIC CIRRHOSIS OF LIVER WITH ASCITES (HCC): Primary | ICD-10-CM

## 2018-07-11 DIAGNOSIS — R10.30 LOWER ABDOMINAL PAIN: ICD-10-CM

## 2018-07-11 DIAGNOSIS — R16.0 HEPATOMEGALY: ICD-10-CM

## 2018-07-11 DIAGNOSIS — N39.0 URINARY TRACT INFECTION WITHOUT HEMATURIA, SITE UNSPECIFIED: ICD-10-CM

## 2018-07-11 PROCEDURE — 99214 OFFICE O/P EST MOD 30 MIN: CPT | Performed by: NURSE PRACTITIONER

## 2018-07-11 RX ORDER — CIPROFLOXACIN 500 MG/1
500 TABLET, FILM COATED ORAL EVERY 12 HOURS SCHEDULED
Status: DISCONTINUED | OUTPATIENT
Start: 2018-07-11 | End: 2018-07-12

## 2018-07-11 NOTE — PATIENT INSTRUCTIONS
Urinary Tract Infection, Adult  A urinary tract infection (UTI) is an infection of any part of the urinary tract. The urinary tract includes the:  · Kidneys.  · Ureters.  · Bladder.  · Urethra.    These organs make, store, and get rid of pee (urine) in the body.  Follow these instructions at home:  · Take over-the-counter and prescription medicines only as told by your doctor.  · If you were prescribed an antibiotic medicine, take it as told by your doctor. Do not stop taking the antibiotic even if you start to feel better.  · Avoid the following drinks:  ? Alcohol.  ? Caffeine.  ? Tea.  ? Carbonated drinks.  · Drink enough fluid to keep your pee clear or pale yellow.  · Keep all follow-up visits as told by your doctor. This is important.  · Make sure to:  ? Empty your bladder often and completely. Do not to hold pee for long periods of time.  ? Empty your bladder before and after sex.  ? Wipe from front to back after a bowel movement if you are female. Use each tissue one time when you wipe.  Contact a doctor if:  · You have back pain.  · You have a fever.  · You feel sick to your stomach (nauseous).  · You throw up (vomit).  · Your symptoms do not get better after 3 days.  · Your symptoms go away and then come back.  Get help right away if:  · You have very bad back pain.  · You have very bad lower belly (abdominal) pain.  · You are throwing up and cannot keep down any medicines or water.  This information is not intended to replace advice given to you by your health care provider. Make sure you discuss any questions you have with your health care provider.  Document Released: 06/05/2009 Document Revised: 05/25/2017 Document Reviewed: 11/07/2016  [employer] Patient Education © 2018 [employer]

## 2018-07-11 NOTE — PROGRESS NOTES
GASTROENTEROLOGY OUTPATIENT ESTABLISHED PATIENT NOTE  Patient: HOUSTON GUIDO : 1965  Date of Service: 2018     CC: Establish Care (hepatitis)  Assessment/Plan                                             ASSESSMENT & PLANS     Recent acute alcoholic hepatitis  Alcoholic fatty liver cirrhosis with ascites status post paracentesis.  No evidence for SBP. MELD 14.  No specific cirrhosis per imaging.  Mild anemia.  Transient thrombocytopenia.  APRI and FIB-4 scores are consistent w/ cirrhosis.   Hepatomegaly (per CT) r/t problem # 1  -     Esophagogastroduodenoscopy; Future  · Patient is encouraged to continue to abstain from all forms of alcohol  · Continue diuretic and low sodium diet.  Patient is encouraged to call me if he has a 3 pound weight gain or more per week    Urinary tract infection without hematuria, site unspecified.  Nitrates in the urine has been consistently positive with the most recent 3 UAs  Lower abdominal/suprapubic pain  -     Start ciprofloxacin (CIPRO) tablet 500 mg; Take 1 tablet by mouth Every 12 (Twelve) Hours × 7 days     Follow Up: return to clinic in 2 months    DISCUSSION: The above plan was delineated in details with patient and wife and all questions and concerns were answered.  Patient is also given contact information.  Patient is to return as scheduled or sooner if new problems arise  Subjective                                                     SUBJECTIVE   History of Present Illness  Mr. Houston Guido is a 52 y.o. male was recently hospitalized for acute EtOH hepatitis w/ ascites. Discriminant Function <32.  No steroid needed.  He underwent paracentesis with 2.8L removed 7/3/18. Neg for SBP.  Pt was discharged 3 days ago and was supposed to follow up in 2 weeks, but pt has an appt today.    He reports that he has been complaint w/ low salt diet.  He has seen a dietitian when he was in the hospital last week  After 10 years of sobriety, he resumed drinking  excess alcohol this year.  Last alcohol was on Saturday (June 30) before hospital admission.  He continues to be abstinent from all form of EtOH  Patient reports that he continued to have severe suprapubic abdominal pain.  Patient has associated dysuria, decreased appetite, and bloating.  His weight remains stable since paracentesis and being on diuretics.    ROS:Review of Systems   Constitutional: Positive for appetite change and unexpected weight change.   Gastrointestinal: Positive for abdominal pain.        Bloating   Genitourinary: Positive for dysuria.     Subjective   PAST MED HX: Pt  has a past medical history of Hypertension.  PAST SURG HX: Pt  has no past surgical history on file.  FAM HX: family history includes Diabetes in his father and mother; Hyperlipidemia in his mother; Hypertension in his mother; No Known Problems in his brother, brother, daughter, daughter, and daughter.  SOC HX: Pt  reports that he has been smoking Cigarettes.  He has a 30.00 pack-year smoking history. He has never used smokeless tobacco. He reports that he drinks alcohol. He reports that he uses drugs.  Objective                                                           OBJECTIVE   Allergy: Pt is allergic to lisinopril.  MEDS: •  buprenorphine-naloxone (SUBOXONE) 8-2 MG per SL tablet, Place 2 tablets under the tongue Daily., Disp: , Rfl:   •  furosemide (LASIX) 40 MG tablet, Take 1 tablet by mouth Daily., Disp: 30 tablet, Rfl: 0  •  nicotine (NICODERM CQ) 21 MG/24HR patch, Place 1 patch on the skin Daily., Disp: 28 patch, Rfl: 0  •  spironolactone (ALDACTONE) 100 MG tablet, Take 1 tablet by mouth Daily., Disp: 30 tablet, Rfl: 0  •  diazePAM (VALIUM) 2 MG tablet, Take 1 tablet by mouth Daily., Disp: 3 tablet, Rfl: 0  Urine Cx and Urine  Lab Results   Component Value Date    NITRITEU Positive (A) 07/06/2018    NITRITEU Positive (A) 07/05/2018    NITRITEU Positive (A) 07/02/2018    NITRITEU Negative 09/09/2015    NITRITEU Negative  09/06/2015    LEUKOCYTESUR Small (1+) (A) 07/06/2018    LEUKOCYTESUR Small (1+) (A) 07/05/2018    LEUKOCYTESUR Small (1+) (A) 07/02/2018    LEUKOCYTESUR Negative 09/09/2015    LEUKOCYTESUR Trace 09/06/2015    RBCUA 3-6 (A) 07/06/2018    RBCUA 3-6 (A) 07/05/2018    RBCUA 0-2 07/02/2018    RBCUA 0-3 09/09/2015    RBCUA 0-3 09/06/2015    WBCUA 0-2 07/06/2018    WBCUA 0-2 07/05/2018    WBCUA 0-2 07/02/2018    BACTERIA Trace 07/06/2018    BACTERIA None Seen 07/05/2018    BACTERIA None Seen 07/02/2018    BACTERIA 2+ (H) 09/06/2015    COLORU Dark Yellow (A) 07/06/2018    COLORU [place] (A) 07/05/2018    COLORU Orange (A) 07/02/2018    COLORU Yellow 09/09/2015    COLORU Dark yellow 09/06/2015    CLARITYU Clear 07/06/2018    CLARITYU Clear 07/05/2018    CLARITYU Clear 07/02/2018    CLARITYU Clear 09/09/2015    CLARITYU Slightly Cloudy 09/06/2015       Lab Results   Component Value Date    WBC 8.69 07/07/2018    HGB 11.5 (L) 07/07/2018    HGB 11.9 (L) 07/06/2018    HGB 11.8 (L) 07/04/2018    HCT 34.6 (L) 07/07/2018    HCT 36.2 (L) 07/06/2018    HCT 35.2 (L) 07/04/2018    .8 (H) 07/07/2018    MCHC 33.2 07/07/2018     07/07/2018     07/06/2018     (L) 07/04/2018     Lab Results   Component Value Date     07/07/2018    K 4.3 07/07/2018     07/07/2018    CO2 29.0 07/07/2018    BUN 23 07/07/2018    CREATININE 0.64 07/07/2018    EGFRIFNONA 132 07/07/2018    GLUCOSE 88 07/07/2018    CALCIUM 8.3 (L) 07/07/2018    ANIONGAP 4.0 07/07/2018     Lab Results   Component Value Date     (H) 07/07/2018     (H) 07/06/2018     (H) 07/05/2018    ALT 18 07/07/2018    ALT 15 07/06/2018    ALT 12 07/05/2018    ALKPHOS 191 (H) 07/07/2018    ALKPHOS 219 (H) 07/06/2018    ALKPHOS 240 (H) 07/05/2018    BILITOT 2.1 (H) 07/07/2018    PROTEINTOT 5.6 (L) 07/07/2018    ALBUMIN 2.27 (L) 07/07/2018   No results found for: GGT    Lab Results   Component Value Date    LIPASE 26 07/02/2018     Lab  Results   Component Value Date    HGBA1C 5.5 09/08/2015    TSH 0.21 (L) 09/08/2015     Lab Results   Component Value Date    INR 1.31 (H) 07/02/2018     Lab Results   Component Value Date    HEPAIGM Non-Reactive 07/03/2018    HEPBSAG Non-Reactive 07/03/2018    HEPBIGMCORE Non-Reactive 07/03/2018    HEPCVIRUSABY Non-Reactive 07/03/2018     EXAMINATION: CT ABDOMEN PELVIS WO CONTRAST - 7/7/2018  COMPARISON: 7/2/2018.     FINDINGS: Mild increased markings at the right lung base with tiny right  pleural effusion. There is decrease seen in the amount of fluid within  the abdomen and pelvis when compared to the prior study. The liver  remains enlarged. No definite stone seen within the gallbladder with  vicarious excretion of contrast seen in the gallbladder. Spleen is  homogeneous. Pancreas is unremarkable. Kidneys and adrenal glands within  normal limits. The abdominal portion gastrointestinal tract is within  normal limits. Small lymph nodes again seen within the retroperitoneum  which are stable. The abdominal portion gastrointestinal tract is within  normal limits. There is again a small amount of free fluid seen within  the abdomen and pelvis. No extraluminal air.     PELVIS: Small amount of fluid seen within the pelvis. The pelvic portion  of the gastrointestinal tract is within normal limits. No free  intraperitoneal air. No abnormal mass identified. The bony structures  reveal degenerative changes seen within the spine and pelvis.     IMPRESSION:  Enlargement again seen of the liver. There is some fluid  identified within the abdomen and pelvis,  decreased in amount when  compared to the prior study. No other new findings in the interval.    Wt Readings from Last 5 Encounters:   07/11/18 69.7 kg (153 lb 9.6 oz)   07/05/18 70.7 kg (155 lb 12.8 oz)   body mass index is 23.35 kg/m².,Temp: 99.4 °F (37.4 °C),BP: 122/78,Heart Rate: 119   Physical Exam  General Chronically ill-appearing; uncomfortable due to pain    ENT Oral mucosa pink and moist without thrush or lesions.    CV RRR; normal S1, S2; no M/R/G. No lower extremity edema  GI Abd soft, ND, normal active bowel sounds.  No HSM.  No abd hernia. moderately tender to palpation in suprapubic area  Skin No rash; no lesions; no bruises.  Skin turgor normal  Musc No clubbing; no cyanosis.  Gait steady  Psych Oriented to time, place, and person.  Appropriate affect  Thank you kindly for allowing me to be part of this patient’s care.    Pt care team: Shona SPENCER & Shahla Arreguin MA  07/11/1812:57 PM  [facility]--Gastroenterology  [phone]    CC: No Known Provider  [facility] [id number] FAX:None

## 2018-07-12 DIAGNOSIS — K70.31 ALCOHOLIC CIRRHOSIS OF LIVER WITH ASCITES (HCC): Primary | ICD-10-CM

## 2018-07-12 RX ORDER — CIPROFLOXACIN 500 MG/1
500 TABLET, FILM COATED ORAL EVERY 12 HOURS SCHEDULED
Qty: 14 TABLET | Refills: 0 | Status: SHIPPED | OUTPATIENT
Start: 2018-07-12 | End: 2018-08-10

## 2018-07-26 ENCOUNTER — OUTSIDE FACILITY SERVICE (OUTPATIENT)
Dept: GASTROENTEROLOGY | Facility: CLINIC | Age: 53
End: 2018-07-26

## 2018-07-26 ENCOUNTER — LAB REQUISITION (OUTPATIENT)
Dept: LAB | Facility: HOSPITAL | Age: 53
End: 2018-07-26

## 2018-07-26 DIAGNOSIS — K74.60 CIRRHOSIS OF LIVER (HCC): ICD-10-CM

## 2018-07-26 PROCEDURE — 88312 SPECIAL STAINS GROUP 1: CPT | Performed by: INTERNAL MEDICINE

## 2018-07-26 PROCEDURE — 43239 EGD BIOPSY SINGLE/MULTIPLE: CPT | Performed by: INTERNAL MEDICINE

## 2018-07-26 PROCEDURE — 88305 TISSUE EXAM BY PATHOLOGIST: CPT | Performed by: INTERNAL MEDICINE

## 2018-07-27 LAB
CYTO UR: NORMAL
LAB AP CASE REPORT: NORMAL
LAB AP CLINICAL INFORMATION: NORMAL
PATH REPORT.FINAL DX SPEC: NORMAL
PATH REPORT.GROSS SPEC: NORMAL

## 2018-08-10 ENCOUNTER — TELEPHONE (OUTPATIENT)
Dept: GASTROENTEROLOGY | Facility: CLINIC | Age: 53
End: 2018-08-10

## 2018-08-10 DIAGNOSIS — K29.70 HELICOBACTER PYLORI GASTRITIS: Primary | ICD-10-CM

## 2018-08-10 DIAGNOSIS — B96.81 HELICOBACTER PYLORI GASTRITIS: Primary | ICD-10-CM

## 2018-08-10 RX ORDER — LANSOPRAZOLE 30 MG/1
30 CAPSULE, DELAYED RELEASE ORAL 2 TIMES DAILY
Qty: 28 CAPSULE | Refills: 0 | Status: SHIPPED | OUTPATIENT
Start: 2018-08-10 | End: 2018-08-24

## 2018-08-10 RX ORDER — AMOXICILLIN 500 MG/1
1000 CAPSULE ORAL 2 TIMES DAILY WITH MEALS
Qty: 56 CAPSULE | Refills: 0 | Status: SHIPPED | OUTPATIENT
Start: 2018-08-10 | End: 2018-08-24

## 2018-08-10 RX ORDER — CLARITHROMYCIN 500 MG/1
500 TABLET, COATED ORAL 2 TIMES DAILY WITH MEALS
Qty: 28 TABLET | Refills: 0 | Status: SHIPPED | OUTPATIENT
Start: 2018-08-10 | End: 2018-08-24

## 2018-08-10 NOTE — TELEPHONE ENCOUNTER
Pt reports of some wt gain.  I instructed pt wt himself every wk and let me know if he has more than 3 pounds wt gain per wk.  Pt reports of the scale being broke.  Pt is going to get another one.       Discussed EGD result.  Will send in rx for H-pylori  Discussed w/ pt, regarding liver txp referral.

## 2018-08-14 ENCOUNTER — TELEPHONE (OUTPATIENT)
Dept: GASTROENTEROLOGY | Facility: CLINIC | Age: 53
End: 2018-08-14

## 2018-08-14 NOTE — TELEPHONE ENCOUNTER
Called patient to informed him that the Lansoprazole PA was denied by insurance. Patient may get medication OTC instead.  NO answer; left voice message.

## 2018-09-12 ENCOUNTER — LAB (OUTPATIENT)
Dept: LAB | Facility: HOSPITAL | Age: 53
End: 2018-09-12

## 2018-09-12 ENCOUNTER — OFFICE VISIT (OUTPATIENT)
Dept: GASTROENTEROLOGY | Facility: CLINIC | Age: 53
End: 2018-09-12

## 2018-09-12 VITALS
RESPIRATION RATE: 16 BRPM | HEIGHT: 68 IN | OXYGEN SATURATION: 91 % | BODY MASS INDEX: 24.1 KG/M2 | SYSTOLIC BLOOD PRESSURE: 108 MMHG | TEMPERATURE: 98.9 F | DIASTOLIC BLOOD PRESSURE: 72 MMHG | HEART RATE: 135 BPM | WEIGHT: 159 LBS

## 2018-09-12 DIAGNOSIS — K70.31 ALCOHOLIC CIRRHOSIS OF LIVER WITH ASCITES (HCC): ICD-10-CM

## 2018-09-12 DIAGNOSIS — K70.31 ALCOHOLIC CIRRHOSIS OF LIVER WITH ASCITES (HCC): Primary | ICD-10-CM

## 2018-09-12 LAB
ALBUMIN SERPL-MCNC: 3.12 G/DL (ref 3.2–4.8)
ALBUMIN/GLOB SERPL: 0.8 G/DL (ref 1.5–2.5)
ALP SERPL-CCNC: 127 U/L (ref 25–100)
ALT SERPL W P-5'-P-CCNC: 17 U/L (ref 7–40)
AMMONIA BLD-SCNC: 26 UMOL/L (ref 19–60)
ANION GAP SERPL CALCULATED.3IONS-SCNC: 5 MMOL/L (ref 3–11)
AST SERPL-CCNC: 40 U/L (ref 0–33)
BACTERIA UR QL AUTO: ABNORMAL /HPF
BASOPHILS # BLD AUTO: 0.05 10*3/MM3 (ref 0–0.2)
BASOPHILS NFR BLD AUTO: 0.4 % (ref 0–1)
BILIRUB SERPL-MCNC: 1.1 MG/DL (ref 0.3–1.2)
BILIRUB UR QL STRIP: ABNORMAL
BUN BLD-MCNC: 12 MG/DL (ref 9–23)
BUN/CREAT SERPL: 15.6 (ref 7–25)
CALCIUM SPEC-SCNC: 8.8 MG/DL (ref 8.7–10.4)
CHLORIDE SERPL-SCNC: 93 MMOL/L (ref 99–109)
CLARITY UR: CLEAR
CO2 SERPL-SCNC: 31 MMOL/L (ref 20–31)
COD CRY URNS QL: ABNORMAL /HPF
COLOR UR: YELLOW
CREAT BLD-MCNC: 0.77 MG/DL (ref 0.6–1.3)
DEPRECATED RDW RBC AUTO: 45.7 FL (ref 37–54)
EOSINOPHIL # BLD AUTO: 0.13 10*3/MM3 (ref 0–0.3)
EOSINOPHIL NFR BLD AUTO: 1.1 % (ref 0–3)
ERYTHROCYTE [DISTWIDTH] IN BLOOD BY AUTOMATED COUNT: 14.1 % (ref 11.3–14.5)
GFR SERPL CREATININE-BSD FRML MDRD: 107 ML/MIN/1.73
GLOBULIN UR ELPH-MCNC: 4 GM/DL
GLUCOSE BLD-MCNC: 117 MG/DL (ref 70–100)
GLUCOSE UR STRIP-MCNC: NEGATIVE MG/DL
HCT VFR BLD AUTO: 47.1 % (ref 38.9–50.9)
HGB BLD-MCNC: 16.2 G/DL (ref 13.1–17.5)
HGB UR QL STRIP.AUTO: NEGATIVE
HYALINE CASTS UR QL AUTO: ABNORMAL /LPF
IMM GRANULOCYTES # BLD: 0.05 10*3/MM3 (ref 0–0.03)
IMM GRANULOCYTES NFR BLD: 0.4 % (ref 0–0.6)
INR PPP: 1.27 (ref 0.91–1.09)
KETONES UR QL STRIP: ABNORMAL
LEUKOCYTE ESTERASE UR QL STRIP.AUTO: NEGATIVE
LYMPHOCYTES # BLD AUTO: 1.01 10*3/MM3 (ref 0.6–4.8)
LYMPHOCYTES NFR BLD AUTO: 8.4 % (ref 24–44)
MCH RBC QN AUTO: 30.9 PG (ref 27–31)
MCHC RBC AUTO-ENTMCNC: 34.4 G/DL (ref 32–36)
MCV RBC AUTO: 89.9 FL (ref 80–99)
MONOCYTES # BLD AUTO: 0.84 10*3/MM3 (ref 0–1)
MONOCYTES NFR BLD AUTO: 7 % (ref 0–12)
MUCOUS THREADS URNS QL MICRO: ABNORMAL /HPF
NEUTROPHILS # BLD AUTO: 10.02 10*3/MM3 (ref 1.5–8.3)
NEUTROPHILS NFR BLD AUTO: 83.1 % (ref 41–71)
NITRITE UR QL STRIP: NEGATIVE
PH UR STRIP.AUTO: 5.5 [PH] (ref 5–8)
PLATELET # BLD AUTO: 377 10*3/MM3 (ref 150–450)
PMV BLD AUTO: 10.9 FL (ref 6–12)
POTASSIUM BLD-SCNC: 4.2 MMOL/L (ref 3.5–5.5)
PROT SERPL-MCNC: 7.1 G/DL (ref 5.7–8.2)
PROT UR QL STRIP: ABNORMAL
PROTHROMBIN TIME: 13.3 SECONDS (ref 9.6–11.5)
RBC # BLD AUTO: 5.24 10*6/MM3 (ref 4.2–5.76)
RBC # UR: ABNORMAL /HPF
REF LAB TEST METHOD: ABNORMAL
SODIUM BLD-SCNC: 129 MMOL/L (ref 132–146)
SODIUM UR-SCNC: <10 MMOL/L (ref 30–90)
SP GR UR STRIP: >=1.03 (ref 1–1.03)
SQUAMOUS #/AREA URNS HPF: ABNORMAL /HPF
UROBILINOGEN UR QL STRIP: ABNORMAL
WBC NRBC COR # BLD: 12.05 10*3/MM3 (ref 3.5–10.8)
WBC UR QL AUTO: ABNORMAL /HPF

## 2018-09-12 PROCEDURE — 82140 ASSAY OF AMMONIA: CPT

## 2018-09-12 PROCEDURE — 84300 ASSAY OF URINE SODIUM: CPT

## 2018-09-12 PROCEDURE — 81001 URINALYSIS AUTO W/SCOPE: CPT

## 2018-09-12 PROCEDURE — 36415 COLL VENOUS BLD VENIPUNCTURE: CPT

## 2018-09-12 PROCEDURE — 82105 ALPHA-FETOPROTEIN SERUM: CPT

## 2018-09-12 PROCEDURE — 80053 COMPREHEN METABOLIC PANEL: CPT

## 2018-09-12 PROCEDURE — 85025 COMPLETE CBC W/AUTO DIFF WBC: CPT

## 2018-09-12 PROCEDURE — 99213 OFFICE O/P EST LOW 20 MIN: CPT | Performed by: NURSE PRACTITIONER

## 2018-09-12 PROCEDURE — 85610 PROTHROMBIN TIME: CPT

## 2018-09-12 RX ORDER — CIPROFLOXACIN 500 MG/1
500 TABLET, FILM COATED ORAL 2 TIMES DAILY
COMMUNITY

## 2018-09-12 RX ORDER — ALBUMIN (HUMAN) 12.5 G/50ML
25 SOLUTION INTRAVENOUS ONCE
Status: CANCELLED | OUTPATIENT
Start: 2018-09-12 | End: 2018-09-12

## 2018-09-12 NOTE — PROGRESS NOTES
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 Child Celaya score: Class B.  S/p Paracentesis x 3  Hepatomegaly (per CT), Mild Macrocytic Anemia, Coagulopathy, Hyperbilirubinemia, and LFT elevation  r/t problem # 1  -     CT Guided Paracentesis; Future  -     Comprehensive Metabolic Panel; Future  -     CBC Auto Differential; Future  -     Ammonia; Future  -     Protime-INR; Future  -     AFP Tumor Marker; Future  -     LC Sodium Urine Random - Urine, Clean Catch; Future  -     Urinalysis With Microscopic - Urine, Clean Catch; Future  · Continue low sodium diet and diuretics    Follow Up:follow up with [facility] as previously referred.       DISCUSSION: The above plan was delineated in details with patient and wife and all questions and concerns were answered.  Patient is also given contact information.  Patient is to return as scheduled or sooner if new problems arise  Subjective                                                     SUBJECTIVE   History of Present Illness  Mr. Houston Miller is a 53 y.o. male presents to the clinic today for an evaluation of Abdominal Pain  took abx in July for UTI.    Had 2 paracentesis done twice done in [place].  Set up by PCP.  remoeved 5 and 6 L.  Last episde about 2.5 wls ago  Went to ER in [place] on Monday for abd pain.    Last alcohol was on Saturday () before hospital admission.  He continues to be abstinent from all form of EtOH  EGD, done on 18 by Dr. Pond due to liver cirrhosis/screening for varices, showed a gastritis without bleeding and portal hypertension gastropathy.  No esophageal  varices    ROS:Review of Systems   Constitutional: Positive for fatigue.        Pt currently or recently takes NSAIDS ( (i.e Ibuprofen, Aleve, Advil, Exedrin, BC Powder, diclofenac, meloxicam, & Naproxen, etc)?  No    Pt currently or recently takes abx Yes   HENT: Negative.    Cardiovascular: Negative.    Gastrointestinal: Positive for abdominal distention, abdominal pain, constipation and nausea.   All other systems reviewed and are negative.    Subjective   PAST MED HX: Pt  has a past medical history of Hypertension.  PAST SURG HX: Pt  has no past surgical history on file.  FAM HX: family history includes Diabetes in his father and mother; Hyperlipidemia in his mother; Hypertension in his mother; No Known Problems in his brother, brother, daughter, daughter, and daughter.  SOC HX: Pt  reports that he has been smoking Cigarettes.  He has a 30.00 pack-year smoking history. He has never used smokeless tobacco. He reports that he does not drink alcohol or use drugs.  Objective                                                           OBJECTIVE   Allergy: Pt is allergic to lisinopril.  MEDS: •  buprenorphine-naloxone (SUBOXONE) 8-2 MG per SL tablet, Place 2 tablets under the tongue Daily., Disp: , Rfl:   •   •  furosemide (LASIX) 40 MG tablet, Take 1 tablet by mouth Daily., Disp: 30 tablet, Rfl: 0  •  nicotine (NICODERM CQ) 21 MG/24HR patch, Place 1 patch on the skin Daily., Disp: 28 patch, Rfl: 0  Lab Results   Component Value Date    WBC 8.69 07/07/2018    WBC 8.95 07/06/2018    WBC 8.66 07/04/2018    EOSABS 0.01 07/02/2018    EOSABS 0.11 09/06/2015    HGB 11.5 (L) 07/07/2018    HGB 11.9 (L) 07/06/2018    HGB 11.8 (L) 07/04/2018    HCT 34.6 (L) 07/07/2018    HCT 36.2 (L) 07/06/2018    HCT 35.2 (L) 07/04/2018    .8 (H) 07/07/2018    .4 (H) 07/06/2018    .0 (H) 07/04/2018    MCHC 33.2 07/07/2018    MCHC 32.9 07/06/2018    MCHC 33.5 07/04/2018     07/07/2018     07/06/2018      (L) 07/04/2018     Lab Results   Component Value Date     07/07/2018    K 4.3 07/07/2018     07/07/2018    CO2 29.0 07/07/2018    BUN 23 07/07/2018    CREATININE 0.64 07/07/2018    GLUCOSE 88 07/07/2018    CALCIUM 8.3 (L) 07/07/2018    ANIONGAP 4.0 07/07/2018     Lab Results   Component Value Date     (H) 07/07/2018     (H) 07/06/2018     (H) 07/05/2018    ALT 18 07/07/2018    ALT 15 07/06/2018    ALT 12 07/05/2018    ALKPHOS 191 (H) 07/07/2018    ALKPHOS 219 (H) 07/06/2018    ALKPHOS 240 (H) 07/05/2018    BILITOT 2.1 (H) 07/07/2018    BILITOT 2.3 (H) 07/06/2018    BILITOT 2.6 (H) 07/05/2018    ALBUMIN 2.27 (L) 07/07/2018    ALBUMIN 2.29 (L) 07/06/2018    ALBUMIN 2.34 (L) 07/05/2018     Lab Results   Component Value Date    LIPASE 26 07/02/2018     Lab Results   Component Value Date    HGBA1C 5.5 09/08/2015    TSH 0.21 (L) 09/08/2015     Lab Results   Component Value Date    INR 1.31 (H) 07/02/2018     Lab Results   Component Value Date    HEPAIGM Non-Reactive 07/03/2018    HEPBSAG Non-Reactive 07/03/2018    HEPBIGMCORE Non-Reactive 07/03/2018    HEPCVIRUSABY Non-Reactive 07/03/2018     Lab Results   Component Value Date    THCURSCR Negative 07/02/2018    THCURSCR Negative 09/06/2015    PCPUR Negative 07/02/2018    COCAINEUR Negative 07/02/2018    METAMPSCNUR Negative 07/02/2018    LABOPIASCN Negative 07/02/2018    AMPHETSCREEN Negative 07/02/2018    AMPHETSCREEN Negative 09/06/2015    LABBENZSCN Negative 07/02/2018    LABBENZSCN Negative 09/06/2015    TRICYCLICSCN Negative 07/02/2018    TRICYCLICSCN Negative 09/06/2015    LABMETHSCN Negative 07/02/2018    BARBITSCNUR Negative 07/02/2018    OXYCODONESCN Negative 07/02/2018    PROPOXSCN Negative 07/02/2018    BUPRENORSCNU Positive (A) 07/02/2018   EXAMINATION: CT ABDOMEN PELVIS WO CONTRAST - 7/7/2018      FINDINGS: Mild increased markings at the right lung base with tiny right  pleural effusion. There is decrease seen in the amount of  fluid within  the abdomen and pelvis when compared to the prior study. The liver  remains enlarged. No definite stone seen within the gallbladder with  vicarious excretion of contrast seen in the gallbladder. Spleen is  homogeneous. Pancreas is unremarkable. Kidneys and adrenal glands within  normal limits. The abdominal portion gastrointestinal tract is within  normal limits. Small lymph nodes again seen within the retroperitoneum  which are stable. The abdominal portion gastrointestinal tract is within  normal limits. There is again a small amount of free fluid seen within  the abdomen and pelvis. No extraluminal air.     PELVIS: Small amount of fluid seen within the pelvis. The pelvic portion  of the gastrointestinal tract is within normal limits. No free  intraperitoneal air. No abnormal mass identified. The bony structures  reveal degenerative changes seen within the spine and pelvis.     IMPRESSION:  Enlargement again seen of the liver. There is some fluid  identified within the abdomen and pelvis, decreased in amount when  compared to the prior study. No other new findings in the interval.     Wt Readings from Last 5 Encounters:   09/12/18 72.1 kg (159 lb)   07/11/18 69.7 kg (153 lb 9.6 oz)   07/05/18 70.7 kg (155 lb 12.8 oz)   body mass index is 24.18 kg/m².,Temp: 98.9 °F (37.2 °C),BP: 108/72,Heart Rate: (!) 135   Physical Exam  General Well developed; well nourished; no acute distress.   ENT Oral mucosa pink and moist without thrush or lesions.    Neck Neck supple; trachea midline. No thyromegaly   Resp CTA; no rhonchi, rales, or wheezes.  Respiration effort normal  CV RRR; normal S1, S2; no M/R/G. No lower extremity edema  GI Abd soft, NT, ND, normal active bowel sounds.  No abd hernia. ++ascites  Skin No rash; no lesions; no bruises.  Skin turgor normal  Musc No clubbing; no cyanosis.  Gait steady  Psych Oriented to time, place, and person.  Appropriate affect  Thank you kindly for allowing me to be part  of this patient’s care.    Pt care team: Shona SPENCER & Marifer Duke MA  09/12/181:41 PM  [facility]--Gastroenterology  [phone]    CC: , Bob Rmoo MD  [address] [place] [address]  / [place] RAMIREZ [id number] FAX:[phone]

## 2018-09-13 LAB — AFP-TM SERPL-MCNC: 4.2 NG/ML (ref 0–8.3)

## 2018-09-21 ENCOUNTER — TELEPHONE (OUTPATIENT)
Dept: GASTROENTEROLOGY | Facility: CLINIC | Age: 53
End: 2018-09-21

## 2018-09-21 DIAGNOSIS — E87.1 HYPONATREMIA: ICD-10-CM

## 2018-09-21 DIAGNOSIS — K70.31 ALCOHOLIC CIRRHOSIS OF LIVER WITH ASCITES (HCC): Primary | ICD-10-CM

## 2018-09-21 NOTE — TELEPHONE ENCOUNTER
Dr Jorge Nagel would like for you to call him back when you get a chance today. He wants to talked to you regarding patient's diuretic treatment. Thanks

## 2018-09-24 RX ORDER — SPIRONOLACTONE 50 MG/1
100 TABLET, FILM COATED ORAL DAILY
Qty: 60 TABLET | Refills: 0 | Status: SHIPPED | OUTPATIENT
Start: 2018-09-24

## 2018-09-24 NOTE — TELEPHONE ENCOUNTER
· Discussed lab results w/ pt  · Pt reports of having to get another paracentesis since last clinic visit on 9/12/18, reporting that he could not wait for our appointment due to discomfort.  Patient did not come to get his paracentesis done as ordered here at [facility]  · Will add Aldactone diuretic.  However, I instructed pt that he MUST get his lab repeated next wk on 10/3/18 or so in order to monitor his kidney function and sodium levels  · Pt reports rescheduled his liver txp pt at  from 9/14 to 10/12/18.  I encouraged pt not to defer that appt further d/t pt's advanced liver disease. Pt voices understanding

## 2018-10-01 ENCOUNTER — TELEPHONE (OUTPATIENT)
Dept: GASTROENTEROLOGY | Facility: CLINIC | Age: 53
End: 2018-10-01

## 2018-10-03 ENCOUNTER — TELEPHONE (OUTPATIENT)
Dept: GASTROENTEROLOGY | Facility: CLINIC | Age: 53
End: 2018-10-03

## 2019-02-28 ENCOUNTER — HOSPITAL ENCOUNTER (EMERGENCY)
Facility: HOSPITAL | Age: 54
Discharge: ANOTHER HOSPITAL | End: 2019-03-01
Attending: EMERGENCY MEDICINE | Admitting: EMERGENCY MEDICINE

## 2019-02-28 ENCOUNTER — APPOINTMENT (OUTPATIENT)
Dept: CT IMAGING | Facility: HOSPITAL | Age: 54
End: 2019-02-28

## 2019-02-28 VITALS
WEIGHT: 146 LBS | RESPIRATION RATE: 16 BRPM | HEIGHT: 68 IN | OXYGEN SATURATION: 99 % | SYSTOLIC BLOOD PRESSURE: 100 MMHG | HEART RATE: 117 BPM | BODY MASS INDEX: 22.13 KG/M2 | DIASTOLIC BLOOD PRESSURE: 79 MMHG | TEMPERATURE: 98.1 F

## 2019-02-28 DIAGNOSIS — K43.6 STRANGULATED VENTRAL HERNIA: ICD-10-CM

## 2019-02-28 DIAGNOSIS — K43.6 INCARCERATED VENTRAL HERNIA: Primary | ICD-10-CM

## 2019-02-28 LAB
ALBUMIN SERPL-MCNC: 2.51 G/DL (ref 3.2–4.8)
ALBUMIN/GLOB SERPL: 0.7 G/DL (ref 1.5–2.5)
ALP SERPL-CCNC: 264 U/L (ref 25–100)
ALT SERPL W P-5'-P-CCNC: 23 U/L (ref 7–40)
AMMONIA BLD-SCNC: 43 UMOL/L (ref 19–60)
ANION GAP SERPL CALCULATED.3IONS-SCNC: 7 MMOL/L (ref 3–11)
APTT PPP: 33.7 SECONDS (ref 24–37)
AST SERPL-CCNC: 54 U/L (ref 0–33)
BACTERIA UR QL AUTO: ABNORMAL /HPF
BASOPHILS # BLD AUTO: 0.02 10*3/MM3 (ref 0–0.2)
BASOPHILS NFR BLD AUTO: 0.1 % (ref 0–1)
BILIRUB SERPL-MCNC: 3.4 MG/DL (ref 0.3–1.2)
BILIRUB UR QL STRIP: ABNORMAL
BUN BLD-MCNC: 28 MG/DL (ref 9–23)
BUN/CREAT SERPL: 22.2 (ref 7–25)
CALCIUM SPEC-SCNC: 8.4 MG/DL (ref 8.7–10.4)
CHLORIDE SERPL-SCNC: 89 MMOL/L (ref 99–109)
CLARITY UR: CLEAR
CO2 SERPL-SCNC: 27 MMOL/L (ref 20–31)
COLOR UR: ABNORMAL
CREAT BLD-MCNC: 1.26 MG/DL (ref 0.6–1.3)
D-LACTATE SERPL-SCNC: 2.1 MMOL/L (ref 0.5–2)
DEPRECATED RDW RBC AUTO: 46.9 FL (ref 37–54)
EOSINOPHIL # BLD AUTO: 0.05 10*3/MM3 (ref 0–0.3)
EOSINOPHIL NFR BLD AUTO: 0.3 % (ref 0–3)
ERYTHROCYTE [DISTWIDTH] IN BLOOD BY AUTOMATED COUNT: 13.9 % (ref 11.3–14.5)
GFR SERPL CREATININE-BSD FRML MDRD: 60 ML/MIN/1.73
GLOBULIN UR ELPH-MCNC: 3.5 GM/DL
GLUCOSE BLD-MCNC: 100 MG/DL (ref 70–100)
GLUCOSE UR STRIP-MCNC: NEGATIVE MG/DL
HCT VFR BLD AUTO: 39.8 % (ref 38.9–50.9)
HGB BLD-MCNC: 14.2 G/DL (ref 13.1–17.5)
HGB UR QL STRIP.AUTO: NEGATIVE
HOLD SPECIMEN: NORMAL
HOLD SPECIMEN: NORMAL
HYALINE CASTS UR QL AUTO: ABNORMAL /LPF
IMM GRANULOCYTES # BLD AUTO: 0.26 10*3/MM3 (ref 0–0.05)
IMM GRANULOCYTES NFR BLD AUTO: 1.8 % (ref 0–0.6)
INR PPP: 1.11 (ref 0.85–1.16)
KETONES UR QL STRIP: ABNORMAL
LEUKOCYTE ESTERASE UR QL STRIP.AUTO: ABNORMAL
LIPASE SERPL-CCNC: 18 U/L (ref 6–51)
LYMPHOCYTES # BLD AUTO: 1.09 10*3/MM3 (ref 0.6–4.8)
LYMPHOCYTES NFR BLD AUTO: 7.4 % (ref 24–44)
MCH RBC QN AUTO: 32.9 PG (ref 27–31)
MCHC RBC AUTO-ENTMCNC: 35.7 G/DL (ref 32–36)
MCV RBC AUTO: 92.1 FL (ref 80–99)
MONOCYTES # BLD AUTO: 0.82 10*3/MM3 (ref 0–1)
MONOCYTES NFR BLD AUTO: 5.6 % (ref 0–12)
NEUTROPHILS # BLD AUTO: 12.45 10*3/MM3 (ref 1.5–8.3)
NEUTROPHILS NFR BLD AUTO: 84.8 % (ref 41–71)
NITRITE UR QL STRIP: POSITIVE
PH UR STRIP.AUTO: 5.5 [PH] (ref 5–8)
PLATELET # BLD AUTO: 482 10*3/MM3 (ref 150–450)
PMV BLD AUTO: 9.7 FL (ref 6–12)
POTASSIUM BLD-SCNC: 4.5 MMOL/L (ref 3.5–5.5)
PROT SERPL-MCNC: 6 G/DL (ref 5.7–8.2)
PROT UR QL STRIP: ABNORMAL
PROTHROMBIN TIME: 13.8 SECONDS (ref 11.2–14.3)
RBC # BLD AUTO: 4.32 10*6/MM3 (ref 4.2–5.76)
RBC # UR: ABNORMAL /HPF
REF LAB TEST METHOD: ABNORMAL
SODIUM BLD-SCNC: 123 MMOL/L (ref 132–146)
SP GR UR STRIP: 1.02 (ref 1–1.03)
SQUAMOUS #/AREA URNS HPF: ABNORMAL /HPF
UROBILINOGEN UR QL STRIP: ABNORMAL
WBC NRBC COR # BLD: 14.69 10*3/MM3 (ref 3.5–10.8)
WBC UR QL AUTO: ABNORMAL /HPF
WHOLE BLOOD HOLD SPECIMEN: NORMAL
WHOLE BLOOD HOLD SPECIMEN: NORMAL

## 2019-02-28 PROCEDURE — 85610 PROTHROMBIN TIME: CPT | Performed by: EMERGENCY MEDICINE

## 2019-02-28 PROCEDURE — 96374 THER/PROPH/DIAG INJ IV PUSH: CPT

## 2019-02-28 PROCEDURE — 74177 CT ABD & PELVIS W/CONTRAST: CPT

## 2019-02-28 PROCEDURE — 81001 URINALYSIS AUTO W/SCOPE: CPT | Performed by: EMERGENCY MEDICINE

## 2019-02-28 PROCEDURE — 85730 THROMBOPLASTIN TIME PARTIAL: CPT | Performed by: EMERGENCY MEDICINE

## 2019-02-28 PROCEDURE — 25010000002 FENTANYL CITRATE (PF) 100 MCG/2ML SOLUTION: Performed by: EMERGENCY MEDICINE

## 2019-02-28 PROCEDURE — 83605 ASSAY OF LACTIC ACID: CPT | Performed by: EMERGENCY MEDICINE

## 2019-02-28 PROCEDURE — 85025 COMPLETE CBC W/AUTO DIFF WBC: CPT | Performed by: EMERGENCY MEDICINE

## 2019-02-28 PROCEDURE — 96361 HYDRATE IV INFUSION ADD-ON: CPT

## 2019-02-28 PROCEDURE — 82140 ASSAY OF AMMONIA: CPT | Performed by: EMERGENCY MEDICINE

## 2019-02-28 PROCEDURE — 25010000002 ONDANSETRON PER 1 MG: Performed by: EMERGENCY MEDICINE

## 2019-02-28 PROCEDURE — 99284 EMERGENCY DEPT VISIT MOD MDM: CPT

## 2019-02-28 PROCEDURE — 80053 COMPREHEN METABOLIC PANEL: CPT | Performed by: EMERGENCY MEDICINE

## 2019-02-28 PROCEDURE — 25010000002 IOPAMIDOL 61 % SOLUTION: Performed by: EMERGENCY MEDICINE

## 2019-02-28 PROCEDURE — 83690 ASSAY OF LIPASE: CPT | Performed by: EMERGENCY MEDICINE

## 2019-02-28 RX ORDER — SODIUM CHLORIDE 0.9 % (FLUSH) 0.9 %
10 SYRINGE (ML) INJECTION AS NEEDED
Status: DISCONTINUED | OUTPATIENT
Start: 2019-02-28 | End: 2019-03-01 | Stop reason: HOSPADM

## 2019-02-28 RX ORDER — FENTANYL CITRATE 50 UG/ML
50 INJECTION, SOLUTION INTRAMUSCULAR; INTRAVENOUS
Status: DISCONTINUED | OUTPATIENT
Start: 2019-02-28 | End: 2019-03-01 | Stop reason: HOSPADM

## 2019-02-28 RX ORDER — FUROSEMIDE 40 MG/1
40 TABLET ORAL DAILY
COMMUNITY

## 2019-02-28 RX ORDER — ONDANSETRON 2 MG/ML
4 INJECTION INTRAMUSCULAR; INTRAVENOUS ONCE AS NEEDED
Status: COMPLETED | OUTPATIENT
Start: 2019-02-28 | End: 2019-02-28

## 2019-02-28 RX ADMIN — SODIUM CHLORIDE 500 ML: 9 INJECTION, SOLUTION INTRAVENOUS at 23:27

## 2019-02-28 RX ADMIN — ONDANSETRON 4 MG: 2 INJECTION INTRAMUSCULAR; INTRAVENOUS at 20:38

## 2019-02-28 RX ADMIN — SODIUM CHLORIDE 500 ML: 9 INJECTION, SOLUTION INTRAVENOUS at 20:36

## 2019-02-28 RX ADMIN — FENTANYL CITRATE 50 MCG: 50 INJECTION, SOLUTION INTRAMUSCULAR; INTRAVENOUS at 20:38

## 2019-02-28 RX ADMIN — IOPAMIDOL 100 ML: 612 INJECTION, SOLUTION INTRAVENOUS at 21:45

## 2019-03-01 LAB — HOLD SPECIMEN: NORMAL

## 2019-03-01 NOTE — ED PROVIDER NOTES
Subjective   Sd Miller is a 53 y.o.male who presents to the emergency department with complaints of abdominal pain. The patient states that he has a hernia that has not been able to be reduced and producing pain. The hernia is usually reduced but this is the 2nd day unable to be reduced. He went to his PCP today who referred him to the ED for evaluation and to gather labs. His wife reports that he has been having decreased appetite, decreased fluid intake and intermittent episodes of hallucinations. He denies fever and vomiting. He had a normal BM this morning. He has discussed surgery for the hernia but was turned down due to his diagnosis of cirrhosis secondary to alcoholism. He notes he has not drank EtOH in 2 weeks. There are no other acute complaints at this time.              History provided by:  Patient  Abdominal Pain   Pain location:  Periumbilical  Pain severity:  Moderate  Onset quality:  Sudden  Duration:  2 days  Timing:  Constant  Associated symptoms: nausea    Associated symptoms: no constipation, no diarrhea, no fever and no vomiting        Review of Systems   Constitutional: Positive for appetite change (decreased appetite and fluid intake). Negative for fever.   Gastrointestinal: Positive for abdominal pain and nausea. Negative for constipation, diarrhea and vomiting.   Psychiatric/Behavioral: Positive for hallucinations (intermittent).   All other systems reviewed and are negative.      Past Medical History:   Diagnosis Date   • Cirrhosis (CMS/HCC)    • Hypertension    • Umbilical hernia        Allergies   Allergen Reactions   • Lisinopril Other (See Comments)     Kidney fails       History reviewed. No pertinent surgical history.    Family History   Problem Relation Age of Onset   • Diabetes Mother    • Hyperlipidemia Mother    • Hypertension Mother    • Diabetes Father    • No Known Problems Brother    • No Known Problems Daughter    • No Known Problems Brother    • No Known Problems  Daughter    • No Known Problems Daughter        Social History     Socioeconomic History   • Marital status:      Spouse name: Not on file   • Number of children: Not on file   • Years of education: Not on file   • Highest education level: Not on file   Tobacco Use   • Smoking status: Current Every Day Smoker     Packs/day: 1.00     Years: 30.00     Pack years: 30.00     Types: Cigarettes   • Smokeless tobacco: Never Used   Substance and Sexual Activity   • Alcohol use: No     Comment: 7 12 oz cans of mixed cocktails or beers daily , last drink 06/30/2018   • Drug use: No     Comment: in recovery through a suboxone clinic    • Sexual activity: Defer         Objective   Physical Exam   Constitutional: He is oriented to person, place, and time. No distress.   Cachectic appearance.   HENT:   Head: Normocephalic and atraumatic.   Nose: Nose normal.   Eyes: Conjunctivae are normal. No scleral icterus.   Neck: Normal range of motion. Neck supple.   Cardiovascular: Normal rate, regular rhythm and normal heart sounds.   Pulmonary/Chest: Effort normal and breath sounds normal. No respiratory distress.   Abdominal: Soft. There is tenderness.   Midline interior abdominal wall hernia which is soft and moderately tender to palpation. Hernia is not reducible.  The rest of the abdomen is non-tender.   Musculoskeletal: Normal range of motion.   2+ pedal edema of left lower extremity and 1+ pedal edema of right lower extremity.    Neurological: He is alert and oriented to person, place, and time.   Skin: Skin is warm and dry.   Skin is jaundice.   Psychiatric: He has a normal mood and affect. His behavior is normal.   Nursing note and vitals reviewed.      Procedures         ED Course  ED Course as of Feb 28 9451   Thu Feb 28, 2019   [id number] Dr. Romero spoke with Dr. Mari, General Surgery who states that we don't have hepatologist therefore they don't perform surgery on cirrhotic patients and recommends transfer to .   [TK]    6988 Dr. Romero spoke with Dr. Malcolm,  General Surgery who accepted the patient.  [TK]   2327 I reviewed the notes from [facility] earlier this week.  Patient was admitted there for hyponatremia and acute kidney injury.  Although patient is unable to give details as to why he left AMA he signed out on the 21st of this month AGAINST MEDICAL ADVICE.  As documented above I discussed the case with general surgery at [facility] who gladly accepted the patient in transfer.  I discussed with the nursing staff the emergent need for transfer and they are arranging transport at this time.  [CP]      ED Course User Index  [CP] Stone Romero DO  [TK] Ramiro Roe     Recent Results (from the past 24 hour(s))   Comprehensive Metabolic Panel    Collection Time: 02/28/19  7:52 PM   Result Value Ref Range    Glucose 100 70 - 100 mg/dL    BUN 28 (H) 9 - 23 mg/dL    Creatinine 1.26 0.60 - 1.30 mg/dL    Sodium 123 (L) 132 - 146 mmol/L    Potassium 4.5 3.5 - 5.5 mmol/L    Chloride 89 (L) 99 - 109 mmol/L    CO2 27.0 20.0 - 31.0 mmol/L    Calcium 8.4 (L) 8.7 - 10.4 mg/dL    Total Protein 6.0 5.7 - 8.2 g/dL    Albumin 2.51 (L) 3.20 - 4.80 g/dL    ALT (SGPT) 23 7 - 40 U/L    AST (SGOT) 54 (H) 0 - 33 U/L    Alkaline Phosphatase 264 (H) 25 - 100 U/L    Total Bilirubin 3.4 (H) 0.3 - 1.2 mg/dL    eGFR Non African Amer 60 (L) >60 mL/min/1.73    Globulin 3.5 gm/dL    A/G Ratio 0.7 (L) 1.5 - 2.5 g/dL    BUN/Creatinine Ratio 22.2 7.0 - 25.0    Anion Gap 7.0 3.0 - 11.0 mmol/L   Lipase    Collection Time: 02/28/19  7:52 PM   Result Value Ref Range    Lipase 18 6 - 51 U/L   Ammonia    Collection Time: 02/28/19  7:52 PM   Result Value Ref Range    Ammonia 43 19 - 60 umol/L   Light Blue Top    Collection Time: 02/28/19  7:52 PM   Result Value Ref Range    Extra Tube hold for add-on    Green Top (Gel)    Collection Time: 02/28/19  7:52 PM   Result Value Ref Range    Extra Tube Hold for add-ons.    Lavender Top    Collection  Time: 02/28/19  7:52 PM   Result Value Ref Range    Extra Tube hold for add-on    Gold Top - SST    Collection Time: 02/28/19  7:52 PM   Result Value Ref Range    Extra Tube Hold for add-ons.    CBC Auto Differential    Collection Time: 02/28/19  7:52 PM   Result Value Ref Range    WBC 14.69 (H) 3.50 - 10.80 10*3/mm3    RBC 4.32 4.20 - 5.76 10*6/mm3    Hemoglobin 14.2 13.1 - 17.5 g/dL    Hematocrit 39.8 38.9 - 50.9 %    MCV 92.1 80.0 - 99.0 fL    MCH 32.9 (H) 27.0 - 31.0 pg    MCHC 35.7 32.0 - 36.0 g/dL    RDW 13.9 11.3 - 14.5 %    RDW-SD 46.9 37.0 - 54.0 fl    MPV 9.7 6.0 - 12.0 fL    Platelets 482 (H) 150 - 450 10*3/mm3    Neutrophil % 84.8 (H) 41.0 - 71.0 %    Lymphocyte % 7.4 (L) 24.0 - 44.0 %    Monocyte % 5.6 0.0 - 12.0 %    Eosinophil % 0.3 0.0 - 3.0 %    Basophil % 0.1 0.0 - 1.0 %    Immature Grans % 1.8 (H) 0.0 - 0.6 %    Neutrophils, Absolute 12.45 (H) 1.50 - 8.30 10*3/mm3    Lymphocytes, Absolute 1.09 0.60 - 4.80 10*3/mm3    Monocytes, Absolute 0.82 0.00 - 1.00 10*3/mm3    Eosinophils, Absolute 0.05 0.00 - 0.30 10*3/mm3    Basophils, Absolute 0.02 0.00 - 0.20 10*3/mm3    Immature Grans, Absolute 0.26 (H) 0.00 - 0.05 10*3/mm3   Protime-INR    Collection Time: 02/28/19  7:52 PM   Result Value Ref Range    Protime 13.8 11.2 - 14.3 Seconds    INR 1.11 0.85 - 1.16   aPTT    Collection Time: 02/28/19  7:52 PM   Result Value Ref Range    PTT 33.7 24.0 - 37.0 seconds   Lactic Acid, Plasma    Collection Time: 02/28/19  8:39 PM   Result Value Ref Range    Lactate 2.1 (C) 0.5 - 2.0 mmol/L   Urinalysis With Microscopic If Indicated (No Culture) - Urine, Clean Catch    Collection Time: 02/28/19  8:53 PM   Result Value Ref Range    Color, UA Dark Yellow (A) Yellow, Straw    Appearance, UA Clear Clear    pH, UA 5.5 5.0 - 8.0    Specific Gravity, UA 1.025 1.001 - 1.030    Glucose, UA Negative Negative    Ketones, UA Trace (A) Negative    Bilirubin, UA Moderate (2+) (A) Negative    Blood, UA Negative Negative    Protein,  "UA Trace (A) Negative    Leuk Esterase, UA Trace (A) Negative    Nitrite, UA Positive (A) Negative    Urobilinogen, UA 2.0 E.U./dL (A) 0.2 - 1.0 E.U./dL   Urinalysis, Microscopic Only - Urine, Clean Catch    Collection Time: 02/28/19  8:53 PM   Result Value Ref Range    RBC, UA 3-6 (A) None Seen, 0-2 /HPF    WBC, UA 0-2 None Seen, 0-2 /HPF    Bacteria, UA None Seen None Seen, Trace /HPF    Squamous Epithelial Cells, UA 0-2 None Seen, 0-2 /HPF    Hyaline Casts, UA 0-6 0 - 6 /LPF    Methodology Automated Microscopy      Note: In addition to lab results from this visit, the labs listed above may include labs taken at another facility or during a different encounter within the last 24 hours. Please correlate lab times with ED admission and discharge times for further clarification of the services performed during this visit.    CT Abdomen Pelvis With Contrast   Final Result   Addendum 1 of 1   ADDENDUM:      Typographical error within impression #3 of the initial report as follows:       Please replace \"fungal or\" with \"ongoing.\"      THIS DOCUMENT HAS BEEN ELECTRONICALLY SIGNED BY ROSITA GAMBINO MD      Final   1. Small left pleural effusion with mild bibasilar atelectasis.    2. Mild wall thickening of the entire colon, possibly edema related to ascites    and/or hypoproteinemic state. Alternatively, mild infectious/inflammatory    colitis possible.    3. Moderate amount of ascites, possibly secondary to hepatic disease and/or    fungal or small bowel pathology.    4. Increased attenuation of the mesenteric fat, likely secondary to ascites,    although infectious inflammation or metastatic disease could have this    appearance.    5. Multiple loops of moderately dilated mid small bowel, with focal transition    to normal caliber small bowel distal to herniated small bowel segment. Finds    most compatible with partial small bowel obstruction secondary to small bowel    hernia.    6. Moderate-sized fluid, fat, and small " bowel containing ventral hernia, with    mild wall thickening of the herniated small bowel, suggesting    edema/strangulation.       THIS DOCUMENT HAS BEEN ELECTRONICALLY SIGNED BY ROSITA GAMBINO MD        Vitals:    02/28/19 2131 02/28/19 2300 02/28/19 2330 02/28/19 2331   BP:  101/76 100/79    BP Location:       Patient Position:       Pulse: 118   117   Resp:       Temp:       TempSrc:       SpO2: 99%      Weight:       Height:         Medications   sodium chloride 0.9 % flush 10 mL (not administered)   fentaNYL citrate (PF) (SUBLIMAZE) injection 50 mcg (50 mcg Intravenous Given 2/28/19 2038)   sodium chloride 0.9 % bolus 500 mL (0 mL Intravenous Stopped 2/28/19 2128)   ondansetron (ZOFRAN) injection 4 mg (4 mg Intravenous Given 2/28/19 2038)   iopamidol (ISOVUE-300) 61 % injection 100 mL (100 mL Intravenous Given 2/28/19 2145)   sodium chloride 0.9 % bolus 500 mL (500 mL Intravenous New Bag 2/28/19 2327)     ECG/EMG Results (last 24 hours)     ** No results found for the last 24 hours. **        No orders to display                       MDM    Final diagnoses:   Incarcerated ventral hernia   Strangulated ventral hernia       Documentation assistance provided by gianna Roe.  Information recorded by the scribpaul was done at my direction and has been verified and validated by me.     Ramiro Roe  02/28/19 2012       Ramiro Roe  02/28/19 2317       Stone Romero DO  03/01/19 0000

## 2019-12-09 NOTE — ED PROVIDER NOTES
Susana Miller is a 52 y.o.male who was referred to the emergency department by his PCP Dr. Juventino Patel for evaluation of worsening abdominal pain, distension, and fluid retention attributed to liver failure. The patient says that three months ago he started having severe episodes of nausea and vomiting. The patient does note that he is an alcoholic who been sober for almost ten years before relapsing two years ago (his last drink was two days ago). He attributed his nausea to the alcohol, however, when his vomiting continued he went to his PCP's office and was ultimately diagnosed with a viral infection. When he noticed increased swelling in his testicles, the patient went to an emergency department in [place], [place]. He was treated with oral antibiotics and referred to a urologist at [facility] who was concerned about possible complications from his hernia. He had a CT of the abdomen/pelvis performed and is scheduled for a full body CT scan later this week. The patient says that despite taking the antibiotics as prescribed, the swelling in his testicles has gotten worse and has now spread to the legs and abdomen. He also reports having increasing pain in his groin and difficulty urinating. He did follow up with his PCP last week and was started on Lasix. Although the swelling in his ankles and difficulty urinating has improved, the swelling in his abdomen is grossly unchanged. 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 He is currently in drug rehabilitation and is treated with Suboxone. There are no other acute complaints at this time.          History provided by:  Patient  Abdominal Pain   Pain location:  Generalized  Pain radiates to:  Does not radiate  Pain severity:  Moderate  Onset quality:  Gradual  Duration: 3 months.  Timing:  Constant  Progression:  Worsening  Chronicity:  Chronic  Context: alcohol use    Relieved by:  Nothing  Worsened by:  Nothing  Ineffective  treatments: Lasix, antibiotics.  Associated symptoms: nausea (resolved) and vomiting (resolved)    Risk factors: alcohol abuse and recent hospitalization        Review of Systems   Constitutional: Positive for appetite change and unexpected weight change.   Cardiovascular: Positive for leg swelling.   Gastrointestinal: Positive for abdominal distention, abdominal pain, nausea (resolved) and vomiting (resolved).   Genitourinary: Positive for difficulty urinating, scrotal swelling and testicular pain.   All other systems reviewed and are negative.      Past Medical History:   Diagnosis Date   • Hypertension        Allergies   Allergen Reactions   • Lipitor [Atorvastatin] Unknown (See Comments)     KIDNEY SHUTS DOWN        History reviewed. No pertinent surgical history.    Family History   Problem Relation Age of Onset   • Diabetes Mother    • Hyperlipidemia Mother    • Hypertension Mother    • Diabetes Father    • No Known Problems Brother    • No Known Problems Daughter    • No Known Problems Brother    • No Known Problems Daughter    • No Known Problems Daughter        Social History     Social History   • Marital status:      Social History Main Topics   • Smoking status: Current Every Day Smoker     Packs/day: 1.00     Years: 30.00     Types: Cigarettes   • Smokeless tobacco: Never Used   • Alcohol use Yes      Comment: 7 12 oz cans of mixed cocktails or beers daily    • Drug use: Yes      Comment: in recovery through a suboxone clinic    • Sexual activity: Defer     Other Topics Concern   • Not on file         Objective   Physical Exam   Constitutional: He is oriented to person, place, and time. He appears well-developed and well-nourished. No distress.   HENT:   Head: Normocephalic and atraumatic.   Eyes: Conjunctivae are normal. No scleral icterus.   Neck: Normal range of motion. Neck supple.   Cardiovascular: Regular rhythm and normal heart sounds.  Tachycardia present.    No murmur  heard.  Pulmonary/Chest: Effort normal and breath sounds normal. No respiratory distress.   Abdominal: Soft. Bowel sounds are normal. He exhibits distension. There is hepatomegaly. There is generalized tenderness. There is no rebound and no guarding.   Mild diffuse tenderness.   Musculoskeletal: He exhibits no edema.   Neurological: He is alert and oriented to person, place, and time.   Skin: Skin is warm and dry. No erythema.   Psychiatric: He has a normal mood and affect. His behavior is normal.   Nursing note and vitals reviewed.      Procedures         ED Course     Reviewed US and labs pt sent with him.  INR stable at 1.3, Bili has gone from 1.7 a few days ago to 4.0 today.  US shows fatty liver.    CT shows fatty liver and some ascites.  Labs reviewed. Pt has alcohol in system despite claiming last drink 2 days ago.  Clinically not withdrawing.    Discussed case with Dr Gonzalez who recommends admission for further care.  Patient stable on serial rechecks.  Discussed exam findings, test results so far and concerns in detail at the bedside.  Discussed need for admission for further evaluation and treatment.                  MDM  Number of Diagnoses or Management Options  Abnormal LFTs:   Ascites due to alcoholic cirrhosis (CMS/HCC):   Diffuse abdominal pain:   Tachycardia:      Amount and/or Complexity of Data Reviewed  Clinical lab tests: ordered and reviewed  Tests in the radiology section of CPT®: ordered and reviewed  Decide to obtain previous medical records or to obtain history from someone other than the patient: yes  Review and summarize past medical records: yes  Discuss the patient with other providers: yes  Independent visualization of images, tracings, or specimens: yes        Final diagnoses:   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  Information recorded by the gianna was  done at my direction and has been verified and validated by me.     Josephine Weston  07/02/18 [id number]       Reinier Raymond MD  07/03/18 [id number]     Warm